# Patient Record
Sex: FEMALE | Race: WHITE | Employment: PART TIME | URBAN - METROPOLITAN AREA
[De-identification: names, ages, dates, MRNs, and addresses within clinical notes are randomized per-mention and may not be internally consistent; named-entity substitution may affect disease eponyms.]

---

## 2019-03-25 ENCOUNTER — OFFICE VISIT (OUTPATIENT)
Dept: HEMATOLOGY/ONCOLOGY | Facility: HOSPITAL | Age: 34
End: 2019-03-25
Attending: INTERNAL MEDICINE
Payer: COMMERCIAL

## 2019-03-25 ENCOUNTER — MED REC SCAN ONLY (OUTPATIENT)
Dept: HEMATOLOGY/ONCOLOGY | Facility: HOSPITAL | Age: 34
End: 2019-03-25

## 2019-03-25 VITALS
WEIGHT: 151 LBS | RESPIRATION RATE: 18 BRPM | HEART RATE: 103 BPM | OXYGEN SATURATION: 100 % | DIASTOLIC BLOOD PRESSURE: 65 MMHG | SYSTOLIC BLOOD PRESSURE: 104 MMHG | TEMPERATURE: 97 F

## 2019-03-25 DIAGNOSIS — O99.019 IRON DEFICIENCY ANEMIA OF PREGNANCY: Primary | ICD-10-CM

## 2019-03-25 DIAGNOSIS — D50.9 IRON DEFICIENCY ANEMIA OF PREGNANCY: Primary | ICD-10-CM

## 2019-03-25 DIAGNOSIS — D64.9 NORMOCYTIC ANEMIA: ICD-10-CM

## 2019-03-25 PROCEDURE — 99245 OFF/OP CONSLTJ NEW/EST HI 55: CPT | Performed by: INTERNAL MEDICINE

## 2019-03-25 RX ORDER — PRENATAL VIT/IRON FUM/FOLIC AC 27 MG-1 MG
1 TABLET ORAL DAILY
COMMUNITY
End: 2019-08-14 | Stop reason: ALTCHOICE

## 2019-03-25 RX ORDER — MELATONIN
325
COMMUNITY
End: 2019-04-02 | Stop reason: ALTCHOICE

## 2019-03-25 NOTE — PROGRESS NOTES
Hematology Initial Consultation Note    Patient Name: Haley Liao  Medical Record Number: [id number]    YOB: 1985   Date of Consultation: 3/25/2019   Other providers:  Dr. Luiz Hamilton    Reason for Consultation:  Haley Liao was se pregnancy 3/25/2019     OB/GYN history:  (currently pregnant). On OCP prior to pregnancy. Regular menses- lasts 5 days, on heaviest day changes tampons 3x per day.      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

## 2019-03-25 NOTE — PROGRESS NOTES
Pt here for consult referred by Dr. Ashley Salvador for anemia. Pt is 32 weeks pregnant. Pt has been feeling fatigued but attributes it to just moving back from [place] 3 weeks ago.

## 2019-03-29 ENCOUNTER — OFFICE VISIT (OUTPATIENT)
Dept: HEMATOLOGY/ONCOLOGY | Facility: HOSPITAL | Age: 34
End: 2019-03-29
Attending: INTERNAL MEDICINE
Payer: COMMERCIAL

## 2019-03-29 VITALS
DIASTOLIC BLOOD PRESSURE: 64 MMHG | SYSTOLIC BLOOD PRESSURE: 105 MMHG | RESPIRATION RATE: 16 BRPM | WEIGHT: 152.38 LBS | OXYGEN SATURATION: 97 % | TEMPERATURE: 98 F | HEART RATE: 104 BPM

## 2019-03-29 DIAGNOSIS — O99.019 IRON DEFICIENCY ANEMIA OF PREGNANCY: Primary | ICD-10-CM

## 2019-03-29 DIAGNOSIS — D64.9 NORMOCYTIC ANEMIA: ICD-10-CM

## 2019-03-29 DIAGNOSIS — D50.9 IRON DEFICIENCY ANEMIA OF PREGNANCY: Primary | ICD-10-CM

## 2019-03-29 LAB
BASOPHILS # BLD AUTO: 0.02 X10(3) UL (ref 0–0.2)
BASOPHILS NFR BLD AUTO: 0.2 %
DEPRECATED RDW RBC AUTO: 46.5 FL (ref 35.1–46.3)
EOSINOPHIL # BLD AUTO: 0.06 X10(3) UL (ref 0–0.7)
EOSINOPHIL NFR BLD AUTO: 0.5 %
ERYTHROCYTE [DISTWIDTH] IN BLOOD BY AUTOMATED COUNT: 14.1 % (ref 11–15)
HCT VFR BLD AUTO: 30.8 % (ref 35–48)
HGB BLD-MCNC: 10.4 G/DL (ref 12–16)
HGB RETIC QN AUTO: 30.7 PG (ref 28.2–36.6)
IMM GRANULOCYTES # BLD AUTO: 0.15 X10(3) UL (ref 0–1)
IMM GRANULOCYTES NFR BLD: 1.1 %
IMM RETICS NFR: 0.15 RATIO (ref 0.1–0.3)
LDH SERPL L TO P-CCNC: 165 U/L (ref 84–246)
LYMPHOCYTES # BLD AUTO: 2.1 X10(3) UL (ref 1–4)
LYMPHOCYTES NFR BLD AUTO: 15.9 %
MCH RBC QN AUTO: 30.7 PG (ref 26–34)
MCHC RBC AUTO-ENTMCNC: 33.8 G/DL (ref 31–37)
MCV RBC AUTO: 90.9 FL (ref 80–100)
MONOCYTES # BLD AUTO: 0.88 X10(3) UL (ref 0.1–1)
MONOCYTES NFR BLD AUTO: 6.7 %
NEUTROPHILS # BLD AUTO: 10 X10 (3) UL (ref 1.5–7.7)
NEUTROPHILS # BLD AUTO: 10 X10(3) UL (ref 1.5–7.7)
NEUTROPHILS NFR BLD AUTO: 75.6 %
PLATELET # BLD AUTO: 288 10(3)UL (ref 150–450)
RBC # BLD AUTO: 3.39 X10(6)UL (ref 3.8–5.3)
RETICS # AUTO: 70.5 X10(3) UL (ref 22.5–147.5)
RETICS/RBC NFR AUTO: 2.1 % (ref 0.5–2.5)
VIT B12 SERPL-MCNC: 336 PG/ML (ref 193–986)
WBC # BLD AUTO: 13.2 X10(3) UL (ref 4–11)

## 2019-03-29 PROCEDURE — 85045 AUTOMATED RETICULOCYTE COUNT: CPT

## 2019-03-29 PROCEDURE — 82607 VITAMIN B-12: CPT

## 2019-03-29 PROCEDURE — 83615 LACTATE (LD) (LDH) ENZYME: CPT

## 2019-03-29 PROCEDURE — 96365 THER/PROPH/DIAG IV INF INIT: CPT

## 2019-03-29 PROCEDURE — 96376 TX/PRO/DX INJ SAME DRUG ADON: CPT

## 2019-03-29 PROCEDURE — 36415 COLL VENOUS BLD VENIPUNCTURE: CPT

## 2019-03-29 PROCEDURE — 83921 ORGANIC ACID SINGLE QUANT: CPT

## 2019-03-29 PROCEDURE — 85025 COMPLETE CBC W/AUTO DIFF WBC: CPT

## 2019-03-29 NOTE — PATIENT INSTRUCTIONS
Iron Dextran injection  Brand Name: Lakesha Cedeño  What is this medicine? IRON DEXTRAN (AHY suzy DEX milton) is an iron complex. Iron is used to make healthy red blood cells, which carry oxygen and nutrients through the body.  This medicine is used to treat people It is important not to miss your dose. Call your doctor or health care professional if you are unable to keep an appointment. Where should I keep my medicine? This drug is given in a hospital or clinic and will not be stored at home.   What should I tell

## 2019-03-29 NOTE — PROGRESS NOTES
Education Record    Learner:  Patient and Spouse    Disease / Charles Rater induced fe def anemia    Barriers / Limitations:  None   Comments:    Method:  Discussion   Comments:    General Topics:  Medication, Precautions, Procedure, Side effects and symp

## 2019-04-02 ENCOUNTER — OFFICE VISIT (OUTPATIENT)
Dept: FAMILY MEDICINE CLINIC | Facility: CLINIC | Age: 34
End: 2019-04-02
Payer: COMMERCIAL

## 2019-04-02 VITALS
SYSTOLIC BLOOD PRESSURE: 102 MMHG | HEART RATE: 96 BPM | WEIGHT: 153.81 LBS | BODY MASS INDEX: 26.92 KG/M2 | RESPIRATION RATE: 16 BRPM | DIASTOLIC BLOOD PRESSURE: 50 MMHG | HEIGHT: 63.5 IN

## 2019-04-02 DIAGNOSIS — F41.9 ANXIETY: ICD-10-CM

## 2019-04-02 DIAGNOSIS — Z00.00 ROUTINE GENERAL MEDICAL EXAMINATION AT A HEALTH CARE FACILITY: Primary | ICD-10-CM

## 2019-04-02 PROCEDURE — 99385 PREV VISIT NEW AGE 18-39: CPT | Performed by: FAMILY MEDICINE

## 2019-04-02 NOTE — PROGRESS NOTES
HPI:   Lino Tan is a 30year old female who presents for a complete physical exam.  Last pap:  2017  Last mammogram:  n/a   Menses:  Currently pregnant  Contraception:  /  Previous colonoscopy:  n/a  Family hx of breast, ovarian, cervical or colon any unusual skin lesions  EYES:no vision problems  LUNGS: denies shortness of breath  CARDIOVASCULAR: denies chest pain  GI: denies abdominal pain,  No constipation or diarrhea  : denies dysuria, vaginal discharge or itching  NEURO: denies headaches  PSY

## 2019-04-05 ENCOUNTER — HOSPITAL ENCOUNTER (INPATIENT)
Facility: HOSPITAL | Age: 34
LOS: 2 days | Discharge: HOME | End: 2019-04-07
Attending: OBSTETRICS & GYNECOLOGY | Admitting: OBSTETRICS & GYNECOLOGY
Payer: COMMERCIAL

## 2019-04-05 PROBLEM — Z34.90 PREGNANCY (HCC): Status: ACTIVE | Noted: 2019-04-05

## 2019-04-05 PROBLEM — Z34.90 PREGNANCY: Status: ACTIVE | Noted: 2019-04-05

## 2019-04-05 RX ORDER — TERBUTALINE SULFATE 1 MG/ML
0.25 INJECTION, SOLUTION SUBCUTANEOUS AS NEEDED
Status: DISCONTINUED | OUTPATIENT
Start: 2019-04-05 | End: 2019-04-05 | Stop reason: HOSPADM

## 2019-04-05 RX ORDER — ONDANSETRON 2 MG/ML
4 INJECTION INTRAMUSCULAR; INTRAVENOUS EVERY 6 HOURS PRN
Status: DISCONTINUED | OUTPATIENT
Start: 2019-04-05 | End: 2019-04-05

## 2019-04-05 RX ORDER — BETAMETHASONE SODIUM PHOSPHATE AND BETAMETHASONE ACETATE 3; 3 MG/ML; MG/ML
INJECTION, SUSPENSION INTRA-ARTICULAR; INTRALESIONAL; INTRAMUSCULAR; SOFT TISSUE
Status: COMPLETED
Start: 2019-04-05 | End: 2019-04-05

## 2019-04-05 RX ORDER — MISOPROSTOL 200 UG/1
1000 TABLET ORAL ONCE
Status: COMPLETED | OUTPATIENT
Start: 2019-04-05 | End: 2019-04-05

## 2019-04-05 RX ORDER — EPHEDRINE SULFATE/0.9% NACL/PF 25 MG/5 ML
5 SYRINGE (ML) INTRAVENOUS AS NEEDED
Status: DISCONTINUED | OUTPATIENT
Start: 2019-04-05 | End: 2019-04-05

## 2019-04-05 RX ORDER — DOCUSATE SODIUM 100 MG/1
100 CAPSULE, LIQUID FILLED ORAL
Status: DISCONTINUED | OUTPATIENT
Start: 2019-04-05 | End: 2019-04-07

## 2019-04-05 RX ORDER — BISACODYL 10 MG
10 SUPPOSITORY, RECTAL RECTAL ONCE AS NEEDED
Status: ACTIVE | OUTPATIENT
Start: 2019-04-05 | End: 2019-04-05

## 2019-04-05 RX ORDER — ACETAMINOPHEN 325 MG/1
650 TABLET ORAL EVERY 6 HOURS PRN
Status: DISCONTINUED | OUTPATIENT
Start: 2019-04-05 | End: 2019-04-07

## 2019-04-05 RX ORDER — SODIUM CHLORIDE, SODIUM LACTATE, POTASSIUM CHLORIDE, CALCIUM CHLORIDE 600; 310; 30; 20 MG/100ML; MG/100ML; MG/100ML; MG/100ML
INJECTION, SOLUTION INTRAVENOUS CONTINUOUS
Status: DISCONTINUED | OUTPATIENT
Start: 2019-04-05 | End: 2019-04-05 | Stop reason: HOSPADM

## 2019-04-05 RX ORDER — ZOLPIDEM TARTRATE 5 MG/1
5 TABLET ORAL NIGHTLY PRN
Status: DISCONTINUED | OUTPATIENT
Start: 2019-04-05 | End: 2019-04-07

## 2019-04-05 RX ORDER — DEXTROSE, SODIUM CHLORIDE, SODIUM LACTATE, POTASSIUM CHLORIDE, AND CALCIUM CHLORIDE 5; .6; .31; .03; .02 G/100ML; G/100ML; G/100ML; G/100ML; G/100ML
INJECTION, SOLUTION INTRAVENOUS AS NEEDED
Status: DISCONTINUED | OUTPATIENT
Start: 2019-04-05 | End: 2019-04-05 | Stop reason: HOSPADM

## 2019-04-05 RX ORDER — METHYLERGONOVINE MALEATE 0.2 MG/ML
0.2 INJECTION INTRAVENOUS ONCE
Status: COMPLETED | OUTPATIENT
Start: 2019-04-05 | End: 2019-04-05

## 2019-04-05 RX ORDER — BETAMETHASONE SODIUM PHOSPHATE AND BETAMETHASONE ACETATE 3; 3 MG/ML; MG/ML
12 INJECTION, SUSPENSION INTRA-ARTICULAR; INTRALESIONAL; INTRAMUSCULAR; SOFT TISSUE EVERY 24 HOURS
Status: COMPLETED | OUTPATIENT
Start: 2019-04-05 | End: 2019-04-05

## 2019-04-05 RX ORDER — MISOPROSTOL 200 UG/1
TABLET ORAL
Status: COMPLETED
Start: 2019-04-05 | End: 2019-04-05

## 2019-04-05 RX ORDER — ONDANSETRON 2 MG/ML
INJECTION INTRAMUSCULAR; INTRAVENOUS
Status: DISPENSED
Start: 2019-04-05 | End: 2019-04-05

## 2019-04-05 RX ORDER — IBUPROFEN 600 MG/1
600 TABLET ORAL ONCE AS NEEDED
Status: DISCONTINUED | OUTPATIENT
Start: 2019-04-05 | End: 2019-04-05 | Stop reason: HOSPADM

## 2019-04-05 RX ORDER — SIMETHICONE 80 MG
80 TABLET,CHEWABLE ORAL 3 TIMES DAILY PRN
Status: DISCONTINUED | OUTPATIENT
Start: 2019-04-05 | End: 2019-04-07

## 2019-04-05 RX ORDER — NALBUPHINE HCL 10 MG/ML
2.5 AMPUL (ML) INJECTION
Status: DISCONTINUED | OUTPATIENT
Start: 2019-04-05 | End: 2019-04-05

## 2019-04-05 RX ORDER — AMMONIA INHALANTS 0.04 G/.3ML
0.3 INHALANT RESPIRATORY (INHALATION) AS NEEDED
Status: DISCONTINUED | OUTPATIENT
Start: 2019-04-05 | End: 2019-04-05 | Stop reason: HOSPADM

## 2019-04-05 RX ORDER — METHYLERGONOVINE MALEATE 0.2 MG/ML
INJECTION INTRAVENOUS
Status: DISPENSED
Start: 2019-04-05 | End: 2019-04-06

## 2019-04-05 RX ORDER — TRISODIUM CITRATE DIHYDRATE AND CITRIC ACID MONOHYDRATE 500; 334 MG/5ML; MG/5ML
30 SOLUTION ORAL AS NEEDED
Status: DISCONTINUED | OUTPATIENT
Start: 2019-04-05 | End: 2019-04-05 | Stop reason: HOSPADM

## 2019-04-05 RX ORDER — IBUPROFEN 600 MG/1
600 TABLET ORAL EVERY 6 HOURS
Status: DISCONTINUED | OUTPATIENT
Start: 2019-04-05 | End: 2019-04-07

## 2019-04-05 NOTE — L&D DELIVERY NOTE
Amrit Matthew [[id number]]    Labor Events     labor?:  Yes   steroids?:  Partial Course  Antibiotics received during labor?:  Yes  Antibiotics (enter # doses in comment):  ampicillin  Rupture date/time:  2019 0100     Rupture typ Blue or pale Acrocyanotic Completely pink    Heart rate Absent <100 bpm >100 bpm    Reflex irritability No response Grimace Cry or active withdrawal    Muscle tone Limp Some flexion Active motion    Respiratory effort Absent Weak cry; hypoventilation Good,

## 2019-04-05 NOTE — H&P
[facility]    PATIENT'S NAME: Gordon Marshallris   ATTENDING PHYSICIAN: Emilee Rg M.D.    PATIENT ACCOUNT#:   [de-identified]    LOCATION:  [address] [place] [address]  MEDICAL RECORD #:   [id number]       YOB: 1985  ADMISSION DATE: weighs approximately 151 pounds. Vital signs stable. Afebrile, blood pressure 87/53, pulse 116, temperature 98.1. ASSESSMENT:    1.    Intrauterine pregnancy at 34-1/2 weeks with  spontaneous rupture of membranes, in labor, status post 1 dose

## 2019-04-05 NOTE — PROGRESS NOTES
Here to drop off belongings w/ Valentine Jimenez RN L&D, then over to NICU to see baby with Josh Paige and , will do assessment upon return.

## 2019-04-05 NOTE — PROGRESS NOTES
Report was called to [facility]. Pt was brought to NICU in wheelchair-stable condition. RN present with patient.

## 2019-04-06 PROCEDURE — 99254 IP/OBS CNSLTJ NEW/EST MOD 60: CPT | Performed by: SPECIALIST

## 2019-04-06 RX ORDER — MELATONIN
325
Status: DISCONTINUED | OUTPATIENT
Start: 2019-04-06 | End: 2019-04-07

## 2019-04-06 NOTE — CONSULTS
[facility] Report of Consultation      Patient Name: Salvador Huntley   YOB: 1985  Medical Record Number: [id number]  Consulting Physician: Corby Pino. Arlin Ellis M.D.    Referring Physician: Jigna Quispe    Date o betamethasone sod phos & acet (CELESTONE) 6 (3-3) MG/ML injection 12 mg 12 mg Intramuscular Q24H   [COMPLETED] lactated ringers IV bolus 1,000 mL 1,000 mL Intravenous Once   [COMPLETED] fentaNYL citrate (SUBLIMAZE) 0.05 MG/ML injection 100 mcg 100 mcg Epid Pulse 80   Temp 98.6 °F (37 °C) (Oral)   Resp 16   Ht 1.6 m (5' 3\")   Wt 69.4 kg (153 lb)   SpO2 99%   Breastfeeding? Yes   BMI 27.10 kg/m²     Physical Examination   Constitutional NAD. Head Normocephalic and atraumatic.   Eyes Conjunctiva clear; sclera Value Ref Range    Ferritin 527.4 (H) 12.0 - 160.0 ng/mL     [facility] Impression and Plan   Iron deficiency anemia complicating pregnancy: Patient received 1000 mg of IV iron 1 week ago.  Hb decreased after delivery but only 1 g

## 2019-04-06 NOTE — PLAN OF CARE
ANXIETY    • Will report anxiety at manageable levels Progressing        PAIN - ADULT    • Verbalizes/displays adequate comfort level or patient's stated pain goal Progressing        Patient/Family Goals    • Patient/Family Long Term Goal Progressing

## 2019-04-06 NOTE — L&D DELIVERY NOTE
[facility]    PATIENT'S NAME: Ana [place]   ATTENDING PHYSICIAN: Vitor Salgado M.D.    PATIENT ACCOUNT #: [de-identified] LOCATION:  [address] [place] [address]   MEDICAL RECORD #: [id number] YOB: 1985   ADMISSION DATE: 04/05/2019 MEGAN Kimmie Marie was brought to the NICU secondary to prematurity, but he was doing well.     Dictated By Kim Poon M.D.  d: 04/05/2019 14:48:54  t: 04/05/2019 19:11:55  [facility] [id number]  [facility]/

## 2019-04-06 NOTE — PROGRESS NOTES
[facility]  Post-Partum Vaginal Delivery Progress Note    Mary Venus Patient Status:  Inpatient    1985 MRN [id number]   [place] 2SW-J Attending [address] [place] [address], Yenny Davila 647 Day # 1 PCP Charlie Young MD     [address]

## 2019-04-07 VITALS
RESPIRATION RATE: 18 BRPM | HEART RATE: 80 BPM | BODY MASS INDEX: 27.11 KG/M2 | OXYGEN SATURATION: 99 % | HEIGHT: 63 IN | TEMPERATURE: 98 F | WEIGHT: 153 LBS | DIASTOLIC BLOOD PRESSURE: 75 MMHG | SYSTOLIC BLOOD PRESSURE: 100 MMHG

## 2019-04-07 PROBLEM — Z34.90 PREGNANCY (HCC): Status: RESOLVED | Noted: 2019-04-05 | Resolved: 2019-04-07

## 2019-04-07 PROBLEM — Z34.90 PREGNANCY: Status: RESOLVED | Noted: 2019-04-05 | Resolved: 2019-04-07

## 2019-04-07 NOTE — PLAN OF CARE
Problem: Patient/Family Goals  Goal: Patient/Family Long Term Goal  Description  Patient's Long Term Goal: patient will have uncomplicated delivery of , following steroid protocol.     Interventions:  - See additional Care Plan goals for specific i Term Goal:Experiences normal postpartum course  Description  INTERVENTIONS:  - Assess and monitor vital signs and lab values. - Assess fundus and lochia. - Provide ice/sitz baths for perineum discomfort.   - Monitor healing of incision/episiotomy/lacerati assistance with proper latch. - Review techniques for milk expression (breast pumping) and storage of breast milk. Provide pumping equipment/supplies, instructions and assistance, as needed.   - Encourage rooming-in and breast feeding on demand.  Dominique Holcomb

## 2019-04-07 NOTE — PROGRESS NOTES
Discharge patient home as order. Teaching complete, patient feel comfortable to take care herself, and baby is stay in NICU.

## 2019-04-07 NOTE — PROGRESS NOTES
[facility]  Post-Partum Vaginal Delivery Progress Note    Sera Cruz Patient Status:  Inpatient    1985 MRN [id number]   [facility] [place] 2SW-J Attending [address] [place] [address], [facility] Jah Davila 647 Day # 2 PCP Chelsea Brown MD     [address]

## 2019-04-10 ENCOUNTER — TELEPHONE (OUTPATIENT)
Dept: OBGYN UNIT | Facility: HOSPITAL | Age: 34
End: 2019-04-10

## 2019-04-10 NOTE — PROGRESS NOTES
Reviewed self care w / mom, she verbalizes understanding of instructions reviewed. Encourage to follow up w/ MDs as directed and w/ questions/concerns. Infant remains in nicu and doing well, mom here much of the time.

## 2019-04-22 ENCOUNTER — OFFICE VISIT (OUTPATIENT)
Dept: HEMATOLOGY/ONCOLOGY | Facility: HOSPITAL | Age: 34
End: 2019-04-22
Attending: INTERNAL MEDICINE
Payer: COMMERCIAL

## 2019-04-22 VITALS
WEIGHT: 137.5 LBS | HEART RATE: 83 BPM | SYSTOLIC BLOOD PRESSURE: 109 MMHG | RESPIRATION RATE: 18 BRPM | TEMPERATURE: 97 F | BODY MASS INDEX: 24.36 KG/M2 | OXYGEN SATURATION: 97 % | DIASTOLIC BLOOD PRESSURE: 59 MMHG | HEIGHT: 62.99 IN

## 2019-04-22 DIAGNOSIS — D50.9 IRON DEFICIENCY ANEMIA OF PREGNANCY: ICD-10-CM

## 2019-04-22 DIAGNOSIS — O99.019 IRON DEFICIENCY ANEMIA OF PREGNANCY: ICD-10-CM

## 2019-04-22 PROCEDURE — 99214 OFFICE O/P EST MOD 30 MIN: CPT | Performed by: INTERNAL MEDICINE

## 2019-04-22 NOTE — PROGRESS NOTES
Education Record    Learner:  Patient    Disease / Raynald Scrivener    Barriers / Limitations:  None   Comments:    Method:  Brief focused   Comments:    General Topics:  Plan of care reviewed   Comments:    Outcome:  Shows understanding   Comments:pt here

## 2019-04-22 NOTE — PROGRESS NOTES
Hematology Clinic Follow Up Visit    Patient Name: Sera Cruz  Medical Record Number: [id number]    YOB: 1985    Other providers:  Dr. Luiz Hamilton    Reason for Consultation:  Sera Cruz was seen today for the diagnosis of an (PRENATAL/FOLIC ACID) Oral Tab Take 1 tablet by mouth daily. Disp:  Rfl:      Allergies:   No Known Allergies    Psychosocial History:  Social History    Social History Narrative      . 1 son born 4/2019.   Not currently working since moved from [employer] palpable lymphadenopathy  Skin: no rashes or petechiae    Laboratory:  Lab Results   Component Value Date    WBC 12.3 (H) 04/07/2019    WBC 23.5 (H) 04/06/2019    WBC 18.8 (H) 04/05/2019    HGB 10.5 (L) 04/07/2019    HGB 9.8 (L) 04/06/2019    HGB 10.9 (L)

## 2019-05-20 ENCOUNTER — APPOINTMENT (OUTPATIENT)
Dept: HEMATOLOGY/ONCOLOGY | Facility: HOSPITAL | Age: 34
End: 2019-05-20
Attending: INTERNAL MEDICINE
Payer: COMMERCIAL

## 2019-06-17 NOTE — TELEPHONE ENCOUNTER
Pt states that per Dr Mami Barragan she was to get refill for Sertraline but never did? Can we call in?   Lucila

## 2019-06-17 NOTE — TELEPHONE ENCOUNTER
Patient called back and schedule her medication follow up 10/1/19 with Dr. Scott Headings please refill her medication.

## 2019-06-17 NOTE — TELEPHONE ENCOUNTER
LOV 4/2/19 and at that time, pt was advised to follow up in 6 months. No future appointments. Please call pt and assist her in scheduling appt for October. Once appt scheduled, please send task back to triage so that we can refill Zoloft until the appt.

## 2019-06-17 NOTE — TELEPHONE ENCOUNTER
LM for patient to schedule her medication follow up with  in October and send back to triage for refill for Zoloft.

## 2019-07-17 ENCOUNTER — TELEPHONE (OUTPATIENT)
Dept: FAMILY MEDICINE CLINIC | Facility: CLINIC | Age: 34
End: 2019-07-17

## 2019-07-17 NOTE — TELEPHONE ENCOUNTER
Patient would like to switch her Zoloft back down to 25 mg, was placed on 50 mg while pregnant. Patient is now 3 months post partum.  Please advise

## 2019-07-25 LAB
AMB EXT CHOLESTEROL, TOTAL: 220 MG/DL
AMB EXT GLUCOSE: 93 MG/DL
AMB EXT HDL CHOLESTEROL: 59 MG/DL
AMB EXT LDL CHOLESTEROL, DIRECT: 134 MG/DL
AMB EXT TRIGLYCERIDES: 134 MG/DL
AMB EXT TSH: 1.61 MIU/ML
HCT: 40.1 % (ref 35–48)
HGB: 12.6 G/DL (ref 12–16)
MEAN CELL VOLUME: 93 FL (ref 80–100)
PLATELETS: 251 10(3)UL
WBC: 7 X10(3) UL (ref 4–11)

## 2019-07-30 ENCOUNTER — TELEPHONE (OUTPATIENT)
Dept: FAMILY MEDICINE CLINIC | Facility: CLINIC | Age: 34
End: 2019-07-30

## 2019-07-30 NOTE — TELEPHONE ENCOUNTER
Labs received from outside source. Overall look good except mildly elevated LDL cholesterol. Cont healthy diet and exercise. We will recheck next year. Please let me know if you have any questions.   [address], DO 7/30/2019 7:56 AM

## 2019-08-12 ENCOUNTER — TELEPHONE (OUTPATIENT)
Dept: FAMILY MEDICINE CLINIC | Facility: CLINIC | Age: 34
End: 2019-08-12

## 2019-08-12 NOTE — TELEPHONE ENCOUNTER
Noticed blood in toilet some discomfort when going to bathroom no clots no pain made appointment for Wednesday at 9:30 cautioned patient id passing clots or having pain must go to the ED to be seen

## 2019-08-14 ENCOUNTER — OFFICE VISIT (OUTPATIENT)
Dept: FAMILY MEDICINE CLINIC | Facility: CLINIC | Age: 34
End: 2019-08-14
Payer: COMMERCIAL

## 2019-08-14 VITALS
WEIGHT: 133 LBS | HEART RATE: 84 BPM | HEIGHT: 63.5 IN | RESPIRATION RATE: 16 BRPM | BODY MASS INDEX: 23.27 KG/M2 | DIASTOLIC BLOOD PRESSURE: 62 MMHG | SYSTOLIC BLOOD PRESSURE: 104 MMHG | TEMPERATURE: 98 F

## 2019-08-14 DIAGNOSIS — K64.8 INTERNAL HEMORRHOID: Primary | ICD-10-CM

## 2019-08-14 PROCEDURE — 99213 OFFICE O/P EST LOW 20 MIN: CPT | Performed by: PHYSICIAN ASSISTANT

## 2019-08-14 RX ORDER — ETONOGESTREL/ETHINYL ESTRADIOL .12-.015MG
RING, VAGINAL VAGINAL
Refills: 3 | COMMUNITY
Start: 2019-07-11 | End: 2020-04-17

## 2019-08-14 RX ORDER — MULTIVITAMIN
1 TABLET ORAL DAILY
COMMUNITY

## 2019-08-14 NOTE — PROGRESS NOTES
Patient presents with:  Stool: Pt noticed blood in stool for 6 days       HISTORY OF PRESENT ILLNESS  Ayaka Perez is a 30year old female who presents for evaluation of bloody stools.  Started to notice about 6 days ago that the toilet water was full hemorrhoids. ASSESSMENT/ PLAN  1. Internal hemorrhoids  Recommend Miralax, increased fiber and water intake. Also start anusol twice a day for 5 days. Follow up for any persistent or worsening symptoms, fevers, chills.     Patient expresses understanding

## 2019-08-20 NOTE — TELEPHONE ENCOUNTER
LOV 8/14/2019 was acute. Last Px was 4/2/19     Patient was asked to follow-up in: 6 months    Appointment scheduled: 10/1/2019 Gene Carson MD     Refill request for:    Requested Prescriptions     Pending Prescriptions Disp Refills   • SERTRALINE HCL

## 2019-09-13 LAB
ALT: 15
AST: 13
VITAMIN D, 25-HYDROXY: 44.1 NG/ML

## 2019-10-01 PROBLEM — E55.9 VITAMIN D DEFICIENCY: Status: ACTIVE | Noted: 2019-10-01

## 2019-10-01 PROBLEM — F41.9 ANXIETY: Status: ACTIVE | Noted: 2019-10-01

## 2019-10-01 NOTE — PROGRESS NOTES
Param Simpson is a 30year old female. HPI:   Patient presents for medication check. History of anxiety. Was on 25 mg of Zoloft prior to her pregnancy. She increased to 50 mg during her pregnancy. She is now 6 months postpartum.   She recently d [place].       Orders Placed This Encounter      Flulaval 6 months and older 0.5 ml Quad PF [[id number]]    Meds & Refills for this Visit:  Requested Prescriptions      No prescriptions requested or ordered in this encounter     Imaging & Consults:  FLULAVAL INFL

## 2020-03-11 ENCOUNTER — TELEPHONE (OUTPATIENT)
Dept: FAMILY MEDICINE CLINIC | Facility: CLINIC | Age: 35
End: 2020-03-11

## 2020-03-11 RX ORDER — SERTRALINE HYDROCHLORIDE 25 MG/1
25 TABLET, FILM COATED ORAL
Qty: 90 TABLET | Refills: 3 | OUTPATIENT
Start: 2020-03-11

## 2020-03-11 NOTE — TELEPHONE ENCOUNTER
Refill request for:    Requested Prescriptions     Pending Prescriptions Disp Refills   • Sertraline HCl 25 MG Oral Tab 90 tablet 3     Sig: Take 1 tablet (25 mg total) by mouth once daily.         Last Prescribed Quantity Refills   10/1/2019 90 3     LOV 1

## 2020-03-11 NOTE — TELEPHONE ENCOUNTER
Notified pt that she has refills remaining. She will call pharmacy.  Denied refill request.    Future Appointments   Date Time Provider Sammy Fay   4/17/2020 12:30 PM Carmela Vargas MD [facility]

## 2020-03-11 NOTE — TELEPHONE ENCOUNTER
Patient requesting refill on Sertraline 25 MG.  Patient was taking 50 MG but is wanting to go down to 25 MG needs new prescription sent to [facility]

## 2020-03-17 ENCOUNTER — TELEPHONE (OUTPATIENT)
Dept: FAMILY MEDICINE CLINIC | Facility: CLINIC | Age: 35
End: 2020-03-17

## 2020-03-17 RX ORDER — SERTRALINE HYDROCHLORIDE 25 MG/1
25 TABLET, FILM COATED ORAL
Qty: 90 TABLET | Refills: 0 | Status: SHIPPED | OUTPATIENT
Start: 2020-03-17 | End: 2020-12-04

## 2020-03-17 NOTE — TELEPHONE ENCOUNTER
Refill for 25 mg Sertraline was never received to [facility] [address], she would like it sent to [facility] [employer]. There is no receipt received from [facility] [address]. Patient only has two days left.

## 2020-03-27 ENCOUNTER — TELEPHONE (OUTPATIENT)
Dept: FAMILY MEDICINE CLINIC | Facility: CLINIC | Age: 35
End: 2020-03-27

## 2020-03-27 NOTE — TELEPHONE ENCOUNTER
Received medical records request from [facility] requesting patient's medical records from the past 5 years. All records located in [place] in which request was sent to [employer].  Contact [facility] [phone]  Case # [id number]

## 2020-04-02 ENCOUNTER — TELEPHONE (OUTPATIENT)
Dept: FAMILY MEDICINE CLINIC | Facility: CLINIC | Age: 35
End: 2020-04-02

## 2020-04-02 NOTE — TELEPHONE ENCOUNTER
Virtual/Telephone Check-In    Alpesh Rodrigez verbally consents to a Virtual/Telephone Check-In service on 4/2/2020. Patient understands and accepts financial responsibility for any deductible, co-insurance and/or co-pays associated with this service.

## 2020-04-17 NOTE — PROGRESS NOTES
Virtual Check-In    Janva Simpson verbally consents to a [employer] on 04/17/20. Patient understands and accepts financial responsibility for any deductible, co-insurance and/or co-pays associated with this service.     Duration of the se

## 2020-06-12 ENCOUNTER — TELEPHONE (OUTPATIENT)
Dept: FAMILY MEDICINE CLINIC | Facility: CLINIC | Age: 35
End: 2020-06-12

## 2020-11-25 ENCOUNTER — APPOINTMENT (OUTPATIENT)
Dept: LAB | Age: 35
End: 2020-11-25
Attending: PHYSICIAN ASSISTANT
Payer: COMMERCIAL

## 2020-11-25 ENCOUNTER — VIRTUAL PHONE E/M (OUTPATIENT)
Dept: FAMILY MEDICINE CLINIC | Facility: CLINIC | Age: 35
End: 2020-11-25
Payer: COMMERCIAL

## 2020-11-25 ENCOUNTER — TELEPHONE (OUTPATIENT)
Dept: FAMILY MEDICINE CLINIC | Facility: CLINIC | Age: 35
End: 2020-11-25

## 2020-11-25 DIAGNOSIS — Z20.822 ENCOUNTER FOR SCREENING LABORATORY TESTING FOR COVID-19 VIRUS: Primary | ICD-10-CM

## 2020-11-25 DIAGNOSIS — Z20.822 EXPOSURE TO COVID-19 VIRUS: ICD-10-CM

## 2020-11-25 DIAGNOSIS — R50.9 FEVER, UNSPECIFIED: ICD-10-CM

## 2020-11-25 DIAGNOSIS — Z20.822 EXPOSURE TO COVID-19 VIRUS: Primary | ICD-10-CM

## 2020-11-25 PROCEDURE — 99213 OFFICE O/P EST LOW 20 MIN: CPT | Performed by: PHYSICIAN ASSISTANT

## 2020-11-25 NOTE — TELEPHONE ENCOUNTER
Patient is awaiting her son's covid results and now herself has a low grade fever, almost 8 weeks pregnant, not sure if she should be tested, looking for a recommendation

## 2020-11-25 NOTE — PROGRESS NOTES
Virtual Telephone Check-In    Marylou Haro verbally consents to a Virtual/Telephone Check-In visit on 11/25/20. Patient has been referred to the [facility] website at [email or web address] to review the yearly Consent to Treat document.     Patient under

## 2020-11-25 NOTE — TELEPHONE ENCOUNTER
C/o temp of 99.5 has body aches son was in urgent care for difficulty breathing still waiting on results she wants to know if she should be tested.

## 2020-11-30 ENCOUNTER — TELEPHONE (OUTPATIENT)
Dept: FAMILY MEDICINE CLINIC | Facility: CLINIC | Age: 35
End: 2020-11-30

## 2020-11-30 ENCOUNTER — VIRTUAL PHONE E/M (OUTPATIENT)
Dept: FAMILY MEDICINE CLINIC | Facility: CLINIC | Age: 35
End: 2020-11-30
Payer: COMMERCIAL

## 2020-11-30 DIAGNOSIS — U07.1 COVID-19 VIRUS INFECTION: Primary | ICD-10-CM

## 2020-11-30 DIAGNOSIS — Z3A.08 8 WEEKS GESTATION OF PREGNANCY: ICD-10-CM

## 2020-11-30 PROCEDURE — 99213 OFFICE O/P EST LOW 20 MIN: CPT | Performed by: PHYSICIAN ASSISTANT

## 2020-11-30 NOTE — PROGRESS NOTES
Virtual Check-In    Alexus Razo verbally consents to a [employer] on 11/30/20. Patient understands and accepts financial responsibility for any deductible, co-insurance and/or co-pays associated with this service.     Duration of the se

## 2020-12-04 ENCOUNTER — TELEPHONE (OUTPATIENT)
Dept: FAMILY MEDICINE CLINIC | Facility: CLINIC | Age: 35
End: 2020-12-04

## 2020-12-04 NOTE — TELEPHONE ENCOUNTER
Called and takked to patient and informed her of dosage change and refill from Dr Mary Feliciano she will follow up in 3 months

## 2020-12-04 NOTE — TELEPHONE ENCOUNTER
Patient called requesting a refill on the Sertraline, also if Dr Juan Castle would approve changing it from 25mg to 50mg?

## 2021-01-15 NOTE — PROGRESS NOTES
Outpatient Maternal-Fetal Medicine Consultation    Dear Dr. Aguilar Exchange,    Thank you for requesting ultrasound evaluation and maternal fetal medicine consultation on your patient Emmanuel Denny.   As you are aware she is a 31year old female with a Singlet daily., Disp: , Rfl:   •  Cholecalciferol 5000 units Oral Tab, Take 1 tablet by mouth daily. Pt stating taking 2000 IU , Disp: , Rfl:   •  Levonorgestrel-Ethinyl Estrad (LEVORA 0.15/30, 28,) 0.15-30 MG-MCG Oral Tab, Take 1 tablet by mouth daily. , Disp: 3 P morbidity and mortality. Approximately 20 percent of  deliveries are iatrogenic and are performed for maternal or fetal indications, such as intrauterine growth restriction, preeclampsia, placenta previa, or nonreassuring fetal testing.  Of the [facility] Cocaine is the most common illicit substance associated with  labor in the [place], and has been detected in approximately 60 percent of women in  labor who have positive toxicology tests.  Alcohol and toluene are additional substances a including over 1700 women with a marvin gestation and past history of sPTB, hydroxyprogesterone caproate injections (Katie) did not reduce PTB <35 weeks compared with placebo (11.0 versus 11.5 percent).  After review of these findings, a [address] and [place] coronaviruses) in pregnancy. Among severely ill women, the rate of  delivery and  delivery is increased. This may be due to a number of factors including iatrogenic delivery to facilitate ventilation of the mother.   In light of these early pregnant may require increased doses of SSRI to achieve euthymia.  Women treated for major depression and who are euthymic prior to pregnancy are more likely to experience a relapse when medication is discontinued as compared to pregnant women who continue satisfaction.     IMPRESSION:  · IUP at 16w0d  · Normal routine fetal ultrasound  · History of a LEEP with a subsequent 34-week delivery  · H/o PPROM/ PTD - normal cervical length by transabdominal ultrasound  · Posterior low-lying placenta  · Anxiety contr

## 2021-01-22 ENCOUNTER — OFFICE VISIT (OUTPATIENT)
Dept: PERINATAL CARE | Facility: HOSPITAL | Age: 36
End: 2021-01-22
Attending: OBSTETRICS & GYNECOLOGY
Payer: COMMERCIAL

## 2021-01-22 VITALS
SYSTOLIC BLOOD PRESSURE: 111 MMHG | BODY MASS INDEX: 21.85 KG/M2 | WEIGHT: 128 LBS | HEART RATE: 102 BPM | DIASTOLIC BLOOD PRESSURE: 69 MMHG | HEIGHT: 64 IN

## 2021-01-22 DIAGNOSIS — O35.9XX0 SUSPECTED FETAL ABNORMALITY AFFECTING MANAGEMENT OF MOTHER, SINGLE OR UNSPECIFIED FETUS: ICD-10-CM

## 2021-01-22 DIAGNOSIS — O09.892 HISTORY OF PRETERM DELIVERY, CURRENTLY PREGNANT IN SECOND TRIMESTER: ICD-10-CM

## 2021-01-22 DIAGNOSIS — U07.1 COVID-19: ICD-10-CM

## 2021-01-22 DIAGNOSIS — F41.9 ANXIETY: ICD-10-CM

## 2021-01-22 DIAGNOSIS — U07.1 COVID-19: Primary | ICD-10-CM

## 2021-01-22 PROCEDURE — 76805 OB US >/= 14 WKS SNGL FETUS: CPT | Performed by: OBSTETRICS & GYNECOLOGY

## 2021-01-22 PROCEDURE — 99205 OFFICE O/P NEW HI 60 MIN: CPT | Performed by: OBSTETRICS & GYNECOLOGY

## 2021-02-16 NOTE — PROGRESS NOTES
Landon Garcia     Dear Dr. Mendel Ricketts,     Thank you for requesting ultrasound evaluation and maternal fetal medicine consultation on your patient Silas Dancer.   As you are aware she is a 31year old female  with a hours later she delivered her son Margy Escobedo. She had moved across country 4 weeks prior to his delivery. She had also been weaning on her sertraline dose and feels that stress may have played a part in her  delivery.   Recurrence risk reviewed.   See [facility] perform  delivery, these data support allowing a trial of labor in pregnancies in which the placenta is more than 10 mm from the internal os. Postpartum hemorrhage has been reported to be increased when the placenta is less than 20mm to the os.

## 2021-02-23 ENCOUNTER — OFFICE VISIT (OUTPATIENT)
Dept: PERINATAL CARE | Facility: HOSPITAL | Age: 36
End: 2021-02-23
Attending: OBSTETRICS & GYNECOLOGY
Payer: COMMERCIAL

## 2021-02-23 VITALS
HEIGHT: 64 IN | DIASTOLIC BLOOD PRESSURE: 69 MMHG | BODY MASS INDEX: 22.71 KG/M2 | HEART RATE: 77 BPM | WEIGHT: 133 LBS | SYSTOLIC BLOOD PRESSURE: 105 MMHG

## 2021-02-23 DIAGNOSIS — U07.1 COVID-19: ICD-10-CM

## 2021-02-23 DIAGNOSIS — Z82.79 FAMILY HISTORY OF BIRTH DEFECT: ICD-10-CM

## 2021-02-23 DIAGNOSIS — O09.892 HISTORY OF PRETERM DELIVERY, CURRENTLY PREGNANT IN SECOND TRIMESTER: Primary | ICD-10-CM

## 2021-02-23 DIAGNOSIS — O09.892 HISTORY OF PRETERM DELIVERY, CURRENTLY PREGNANT IN SECOND TRIMESTER: ICD-10-CM

## 2021-02-23 PROCEDURE — 76811 OB US DETAILED SNGL FETUS: CPT | Performed by: OBSTETRICS & GYNECOLOGY

## 2021-02-23 PROCEDURE — 76817 TRANSVAGINAL US OBSTETRIC: CPT | Performed by: OBSTETRICS & GYNECOLOGY

## 2021-02-23 PROCEDURE — 76817 TRANSVAGINAL US OBSTETRIC: CPT

## 2021-02-23 PROCEDURE — 99215 OFFICE O/P EST HI 40 MIN: CPT | Performed by: OBSTETRICS & GYNECOLOGY

## 2021-02-23 NOTE — PROGRESS NOTES
Pt here for Level 2 ultrasound  States +FM  No complaints  States at [facility] office scan at 18 weeks has Previa states No bleeding or spotting

## 2021-06-03 ENCOUNTER — HOSPITAL ENCOUNTER (OUTPATIENT)
Facility: HOSPITAL | Age: 36
Setting detail: OBSERVATION
Discharge: HOME | End: 2021-06-03
Attending: OBSTETRICS & GYNECOLOGY | Admitting: OBSTETRICS & GYNECOLOGY
Payer: COMMERCIAL

## 2021-06-03 VITALS
BODY MASS INDEX: 26.46 KG/M2 | DIASTOLIC BLOOD PRESSURE: 69 MMHG | HEIGHT: 64.02 IN | HEART RATE: 93 BPM | WEIGHT: 155 LBS | TEMPERATURE: 98 F | RESPIRATION RATE: 18 BRPM | SYSTOLIC BLOOD PRESSURE: 111 MMHG

## 2021-06-03 PROBLEM — Z34.90 PREGNANCY: Status: ACTIVE | Noted: 2021-06-03

## 2021-06-03 PROBLEM — Z34.90 PREGNANCY (HCC): Status: ACTIVE | Noted: 2021-06-03

## 2021-06-03 PROCEDURE — 99213 OFFICE O/P EST LOW 20 MIN: CPT

## 2021-06-03 PROCEDURE — 96360 HYDRATION IV INFUSION INIT: CPT

## 2021-06-03 PROCEDURE — 96372 THER/PROPH/DIAG INJ SC/IM: CPT

## 2021-06-03 PROCEDURE — 85025 COMPLETE CBC W/AUTO DIFF WBC: CPT | Performed by: OBSTETRICS & GYNECOLOGY

## 2021-06-03 PROCEDURE — 96361 HYDRATE IV INFUSION ADD-ON: CPT

## 2021-06-03 PROCEDURE — 59025 FETAL NON-STRESS TEST: CPT

## 2021-06-03 PROCEDURE — 36415 COLL VENOUS BLD VENIPUNCTURE: CPT

## 2021-06-03 RX ORDER — URSODIOL 300 MG/1
300 CAPSULE ORAL 2 TIMES DAILY
Status: ON HOLD | COMMUNITY
End: 2021-06-03

## 2021-06-03 RX ORDER — SODIUM CHLORIDE, SODIUM LACTATE, POTASSIUM CHLORIDE, CALCIUM CHLORIDE 600; 310; 30; 20 MG/100ML; MG/100ML; MG/100ML; MG/100ML
INJECTION, SOLUTION INTRAVENOUS CONTINUOUS
Status: DISCONTINUED | OUTPATIENT
Start: 2021-06-03 | End: 2021-06-03

## 2021-06-03 RX ORDER — BETAMETHASONE SODIUM PHOSPHATE AND BETAMETHASONE ACETATE 3; 3 MG/ML; MG/ML
12 INJECTION, SUSPENSION INTRA-ARTICULAR; INTRALESIONAL; INTRAMUSCULAR; SOFT TISSUE EVERY 24 HOURS
Status: DISCONTINUED | OUTPATIENT
Start: 2021-06-03 | End: 2021-06-03

## 2021-06-03 NOTE — PROGRESS NOTES
Pt is a 31year old female admitted to [facility]-A. Patient presents with:  R/o  Labor     Pt is  34w6d intra-uterine pregnancy. History obtained, consents signed. Oriented to room, staff, and plan of care.

## 2021-06-03 NOTE — NST
Nonstress Test   Patient: Emanuel Whyte    Gestation: 34w6d    NST:       Variability: Moderate           Accelerations: Yes           Decelerations: None            Baseline: 135 BPM           Uterine Irritability: Yes           Contractions: Urbano Purvis

## 2021-06-03 NOTE — H&P
[facility]    PATIENT'S NAME: Carmel Denise   ATTENDING PHYSICIAN: Kamilla Leavitt M.D.    PATIENT ACCOUNT#:   [de-identified]    LOCATION:  [facility]  MEDICAL RECORD #:   [id number]       YOB: 1985  ADMISSION DATE:       06/0 negative. Previous hemoglobin 10.9. Patient is O positive. Hepatitis B surface antigen negative. Rubella immune. RPR nonreactive.     MEDICATIONS:  Prenatal vitamins, ursodiol 300 mg 1 tab p.o. b.i.d., Zoloft 50 mg 1 tab p.o. daily, vitamin D3 at 2000

## 2021-06-04 ENCOUNTER — HOSPITAL ENCOUNTER (OUTPATIENT)
Facility: HOSPITAL | Age: 36
Discharge: HOME | End: 2021-06-04
Attending: STUDENT IN AN ORGANIZED HEALTH CARE EDUCATION/TRAINING PROGRAM | Admitting: STUDENT IN AN ORGANIZED HEALTH CARE EDUCATION/TRAINING PROGRAM
Payer: COMMERCIAL

## 2021-06-04 PROCEDURE — 96372 THER/PROPH/DIAG INJ SC/IM: CPT

## 2021-06-04 RX ORDER — BETAMETHASONE SODIUM PHOSPHATE AND BETAMETHASONE ACETATE 3; 3 MG/ML; MG/ML
12 INJECTION, SUSPENSION INTRA-ARTICULAR; INTRALESIONAL; INTRAMUSCULAR; SOFT TISSUE ONCE
Status: COMPLETED | OUTPATIENT
Start: 2021-06-04 | End: 2021-06-04

## 2021-06-04 RX ORDER — BETAMETHASONE SODIUM PHOSPHATE AND BETAMETHASONE ACETATE 3; 3 MG/ML; MG/ML
INJECTION, SUSPENSION INTRA-ARTICULAR; INTRALESIONAL; INTRAMUSCULAR; SOFT TISSUE
Status: COMPLETED
Start: 2021-06-04 | End: 2021-06-04

## 2021-06-04 NOTE — PROGRESS NOTES
Pt is a 31year old female admitted to TR5/TRG5-A. Patient presents with:  Betamethasone Injection     Pt is  35w0d intra-uterine pregnancy. History obtained, consents signed. Oriented to room, staff, and plan of care.     Pt stated she had irreg

## 2021-06-07 DIAGNOSIS — Z34.90 PREGNANCY: Primary | ICD-10-CM

## 2021-06-07 NOTE — TELEPHONE ENCOUNTER
Pt scheduled a video visit with Dr. Federico Cevallos at 11:30 am, she is on bed rest due to early pregnancy

## 2021-06-08 NOTE — PROGRESS NOTES
Jessenia Baird is a 31year old female. Reason for video visit:  F/u medication  This visit is conducted using Telemedicine with live, interactive video and audio. HPI:   Currently 35+ weeks pregnant.   Recently diagnosed with cholestasis and is

## 2021-06-18 ENCOUNTER — LAB ENCOUNTER (OUTPATIENT)
Dept: LAB | Age: 36
End: 2021-06-18
Attending: OBSTETRICS & GYNECOLOGY
Payer: COMMERCIAL

## 2021-06-18 DIAGNOSIS — Z34.90 PREGNANCY: ICD-10-CM

## 2021-06-21 ENCOUNTER — APPOINTMENT (OUTPATIENT)
Dept: OBGYN CLINIC | Facility: HOSPITAL | Age: 36
End: 2021-06-21
Payer: COMMERCIAL

## 2021-06-21 ENCOUNTER — ANESTHESIA EVENT (OUTPATIENT)
Dept: OBGYN UNIT | Facility: HOSPITAL | Age: 36
End: 2021-06-21
Payer: COMMERCIAL

## 2021-06-21 ENCOUNTER — HOSPITAL ENCOUNTER (INPATIENT)
Facility: HOSPITAL | Age: 36
LOS: 2 days | Discharge: HOME | End: 2021-06-23
Attending: OBSTETRICS & GYNECOLOGY | Admitting: OBSTETRICS & GYNECOLOGY
Payer: COMMERCIAL

## 2021-06-21 ENCOUNTER — ANESTHESIA (OUTPATIENT)
Dept: OBGYN UNIT | Facility: HOSPITAL | Age: 36
End: 2021-06-21
Payer: COMMERCIAL

## 2021-06-21 PROCEDURE — 86780 TREPONEMA PALLIDUM: CPT | Performed by: OBSTETRICS & GYNECOLOGY

## 2021-06-21 PROCEDURE — 3E0P7VZ INTRODUCTION OF HORMONE INTO FEMALE REPRODUCTIVE, VIA NATURAL OR ARTIFICIAL OPENING: ICD-10-PCS | Performed by: OBSTETRICS & GYNECOLOGY

## 2021-06-21 PROCEDURE — 85025 COMPLETE CBC W/AUTO DIFF WBC: CPT | Performed by: OBSTETRICS & GYNECOLOGY

## 2021-06-21 PROCEDURE — 86850 RBC ANTIBODY SCREEN: CPT | Performed by: OBSTETRICS & GYNECOLOGY

## 2021-06-21 PROCEDURE — 86901 BLOOD TYPING SEROLOGIC RH(D): CPT | Performed by: OBSTETRICS & GYNECOLOGY

## 2021-06-21 PROCEDURE — 10907ZC DRAINAGE OF AMNIOTIC FLUID, THERAPEUTIC FROM PRODUCTS OF CONCEPTION, VIA NATURAL OR ARTIFICIAL OPENING: ICD-10-PCS | Performed by: OBSTETRICS & GYNECOLOGY

## 2021-06-21 PROCEDURE — 86900 BLOOD TYPING SEROLOGIC ABO: CPT | Performed by: OBSTETRICS & GYNECOLOGY

## 2021-06-21 PROCEDURE — 3E033VJ INTRODUCTION OF OTHER HORMONE INTO PERIPHERAL VEIN, PERCUTANEOUS APPROACH: ICD-10-PCS | Performed by: OBSTETRICS & GYNECOLOGY

## 2021-06-21 PROCEDURE — 82728 ASSAY OF FERRITIN: CPT | Performed by: OBSTETRICS & GYNECOLOGY

## 2021-06-21 RX ORDER — ONDANSETRON 2 MG/ML
4 INJECTION INTRAMUSCULAR; INTRAVENOUS EVERY 6 HOURS PRN
Status: DISCONTINUED | OUTPATIENT
Start: 2021-06-21 | End: 2021-06-22 | Stop reason: HOSPADM

## 2021-06-21 RX ORDER — CEFAZOLIN SODIUM/WATER 2 G/20 ML
2 SYRINGE (ML) INTRAVENOUS ONCE
Status: COMPLETED | OUTPATIENT
Start: 2021-06-21 | End: 2021-06-21

## 2021-06-21 RX ORDER — DOCUSATE SODIUM 100 MG/1
100 CAPSULE, LIQUID FILLED ORAL
Status: DISCONTINUED | OUTPATIENT
Start: 2021-06-21 | End: 2021-06-23

## 2021-06-21 RX ORDER — BUPIVACAINE HCL/0.9 % NACL/PF 0.25 %
5 PLASTIC BAG, INJECTION (ML) EPIDURAL AS NEEDED
Status: DISCONTINUED | OUTPATIENT
Start: 2021-06-21 | End: 2021-06-22

## 2021-06-21 RX ORDER — CARBOPROST TROMETHAMINE 250 UG/ML
INJECTION, SOLUTION INTRAMUSCULAR
Status: COMPLETED
Start: 2021-06-21 | End: 2021-06-21

## 2021-06-21 RX ORDER — IBUPROFEN 600 MG/1
600 TABLET ORAL EVERY 6 HOURS
Status: DISCONTINUED | OUTPATIENT
Start: 2021-06-21 | End: 2021-06-23

## 2021-06-21 RX ORDER — TERBUTALINE SULFATE 1 MG/ML
0.25 INJECTION, SOLUTION SUBCUTANEOUS AS NEEDED
Status: DISCONTINUED | OUTPATIENT
Start: 2021-06-21 | End: 2021-06-22 | Stop reason: HOSPADM

## 2021-06-21 RX ORDER — MELATONIN
325
COMMUNITY
End: 2021-10-14 | Stop reason: ALTCHOICE

## 2021-06-21 RX ORDER — ACETAMINOPHEN 10 MG/ML
1000 INJECTION, SOLUTION INTRAVENOUS EVERY 6 HOURS PRN
Status: DISCONTINUED | OUTPATIENT
Start: 2021-06-21 | End: 2021-06-21

## 2021-06-21 RX ORDER — TRISODIUM CITRATE DIHYDRATE AND CITRIC ACID MONOHYDRATE 500; 334 MG/5ML; MG/5ML
30 SOLUTION ORAL AS NEEDED
Status: DISCONTINUED | OUTPATIENT
Start: 2021-06-21 | End: 2021-06-22 | Stop reason: HOSPADM

## 2021-06-21 RX ORDER — TRANEXAMIC ACID 10 MG/ML
INJECTION, SOLUTION INTRAVENOUS
Status: COMPLETED
Start: 2021-06-21 | End: 2021-06-21

## 2021-06-21 RX ORDER — MISOPROSTOL 200 UG/1
TABLET ORAL
Status: COMPLETED
Start: 2021-06-21 | End: 2021-06-21

## 2021-06-21 RX ORDER — IBUPROFEN 600 MG/1
600 TABLET ORAL EVERY 6 HOURS PRN
Status: DISCONTINUED | OUTPATIENT
Start: 2021-06-21 | End: 2021-06-21 | Stop reason: ALTCHOICE

## 2021-06-21 RX ORDER — METHYLERGONOVINE MALEATE 0.2 MG/ML
INJECTION INTRAVENOUS
Status: COMPLETED
Start: 2021-06-21 | End: 2021-06-21

## 2021-06-21 RX ORDER — ACETAMINOPHEN 10 MG/ML
1000 INJECTION, SOLUTION INTRAVENOUS ONCE
Status: COMPLETED | OUTPATIENT
Start: 2021-06-21 | End: 2021-06-21

## 2021-06-21 RX ORDER — URSODIOL 300 MG/1
CAPSULE ORAL
COMMUNITY
Start: 2021-06-01 | End: 2021-10-14 | Stop reason: ALTCHOICE

## 2021-06-21 RX ORDER — SIMETHICONE 80 MG
80 TABLET,CHEWABLE ORAL 3 TIMES DAILY PRN
Status: DISCONTINUED | OUTPATIENT
Start: 2021-06-21 | End: 2021-06-23

## 2021-06-21 RX ORDER — ACETAMINOPHEN 325 MG/1
650 TABLET ORAL EVERY 6 HOURS PRN
Status: DISCONTINUED | OUTPATIENT
Start: 2021-06-21 | End: 2021-06-23

## 2021-06-21 RX ORDER — ZOLPIDEM TARTRATE 5 MG/1
5 TABLET ORAL NIGHTLY PRN
Status: DISCONTINUED | OUTPATIENT
Start: 2021-06-21 | End: 2021-06-23

## 2021-06-21 RX ORDER — ACETAMINOPHEN 500 MG
500 TABLET ORAL EVERY 6 HOURS PRN
Status: DISCONTINUED | OUTPATIENT
Start: 2021-06-21 | End: 2021-06-21

## 2021-06-21 RX ORDER — SODIUM CHLORIDE, SODIUM LACTATE, POTASSIUM CHLORIDE, CALCIUM CHLORIDE 600; 310; 30; 20 MG/100ML; MG/100ML; MG/100ML; MG/100ML
INJECTION, SOLUTION INTRAVENOUS CONTINUOUS
Status: DISCONTINUED | OUTPATIENT
Start: 2021-06-21 | End: 2021-06-22 | Stop reason: HOSPADM

## 2021-06-21 RX ORDER — DEXTROSE, SODIUM CHLORIDE, SODIUM LACTATE, POTASSIUM CHLORIDE, AND CALCIUM CHLORIDE 5; .6; .31; .03; .02 G/100ML; G/100ML; G/100ML; G/100ML; G/100ML
INJECTION, SOLUTION INTRAVENOUS AS NEEDED
Status: DISCONTINUED | OUTPATIENT
Start: 2021-06-21 | End: 2021-06-22 | Stop reason: HOSPADM

## 2021-06-21 RX ORDER — NALBUPHINE HCL 10 MG/ML
2.5 AMPUL (ML) INJECTION
Status: DISCONTINUED | OUTPATIENT
Start: 2021-06-21 | End: 2021-06-22

## 2021-06-21 RX ORDER — BISACODYL 10 MG
10 SUPPOSITORY, RECTAL RECTAL ONCE AS NEEDED
Status: DISCONTINUED | OUTPATIENT
Start: 2021-06-21 | End: 2021-06-23

## 2021-06-21 RX ORDER — AMMONIA INHALANTS 0.04 G/.3ML
0.3 INHALANT RESPIRATORY (INHALATION) AS NEEDED
Status: DISCONTINUED | OUTPATIENT
Start: 2021-06-21 | End: 2021-06-22 | Stop reason: HOSPADM

## 2021-06-21 RX ORDER — ACETAMINOPHEN 10 MG/ML
INJECTION, SOLUTION INTRAVENOUS
Status: DISPENSED
Start: 2021-06-21 | End: 2021-06-22

## 2021-06-21 NOTE — PROGRESS NOTES
Pt is a 31year old female admitted to   Patient presents with:  Scheduled Induction    Pt is a 37w3d intrauterine pregnancy    History obtained, Pt oriented to room, staff, and plan of care.

## 2021-06-21 NOTE — ANESTHESIA PROCEDURE NOTES
Labor Analgesia  Performed by: Olesya Olea MD  Authorized by: Olesya Olea MD       General Information and Staff    Start Time:   Anesthesiologist: Olesya Olea MD  Performed by:   Anesthesiologist  Patient Location:  OB  Site Identificatio

## 2021-06-21 NOTE — H&P
[facility]    PATIENT'S NAME: VALE Villalba   ATTENDING PHYSICIAN: Batsheva Charlton M.D.    PATIENT ACCOUNT#:   [de-identified]    LOCATION:  [address] [place] [address]  MEDICAL RECORD #:   [id number]       YOB: 1985  ADMISSION DATE:       06 allergy. SOCIAL HISTORY:  No tobacco, no EtOH, no drugs.     FAMILY HISTORY:  Breast cancer in her aunt diagnosed at age 61, Parkinson's in her paternal grandmother age 72, elevated cholesterol in her mother and her father, skeletal disorder in her fathe

## 2021-06-21 NOTE — ANESTHESIA PREPROCEDURE EVALUATION
PRE-OP EVALUATION    Patient Name: Yolande Alaniz    Admit Diagnosis: Pregnancy    Pre-op Diagnosis: * No pre-op diagnosis entered *        Anesthesia Procedure: LABOR ANALGESIA    * No surgeons found in log *    Pre-op vitals reviewed.   Temp: 98.7 ° Rfl: 3, 6/20/2021 at 2000  Multiple Vitamin (MULTI-VITAMIN DAILY) Oral Tab, Take 1 tablet by mouth daily. , Disp: , Rfl: , 6/21/2021 at 0600  Cholecalciferol 5000 units Oral Tab, Take 1 tablet by mouth daily.  Pt stating taking 2000 IU , Disp: , Rfl: , 6/21/

## 2021-06-21 NOTE — PROGRESS NOTES
[facility] Note:  Acetaminophen Therapeutic Duplication      Yolande Alaniz is a(n) 31year old patient who has been prescribed both IV acetaminophen (Ofirmev) and PO acetaminophen .  PO APAP was discontinued per P&T approved protocol for duplicate

## 2021-06-22 PROCEDURE — 85025 COMPLETE CBC W/AUTO DIFF WBC: CPT | Performed by: OBSTETRICS & GYNECOLOGY

## 2021-06-22 RX ORDER — CEFAZOLIN SODIUM/WATER 2 G/20 ML
2 SYRINGE (ML) INTRAVENOUS EVERY 8 HOURS
Status: COMPLETED | OUTPATIENT
Start: 2021-06-22 | End: 2021-06-22

## 2021-06-22 NOTE — L&D DELIVERY NOTE
[facility]    PATIENT'S NAME: VALE Blake   ATTENDING PHYSICIAN: Randolph Espinal M.D.    PATIENT ACCOUNT #: [de-identified] LOCATION:  [address] [place] [address]   MEDICAL RECORD #: [id number] YOB: 1985   ADMISSION DATE: 06/21/2021 DELIVER elevated temperature in labor, it was 100.6. She was also noted to have a URI. She was given a dose of Ancef 2 g IV piggyback and then given a dose of 1000 mg of IV Tylenol. Her temperature did respond to this and did improve.     ASSESSMENT:    1.   A 3

## 2021-06-22 NOTE — PROGRESS NOTES
Pt callled RN to room. Pt c/o shaking, feeling cold, nausea, and being anxious. Pt's axillary Temp at 1800 was 98.7. FHR indeterminate. Pt moving about in bed from side to side. Pt placed sitiing @ 1800.   Pt continues to have large amounts of clear na

## 2021-06-22 NOTE — PROGRESS NOTES
Pt transferred to Mother Baby room [phone] in stable condition. Report given to [facility]. Infant transferred with mother in stable condition. Infant brought down from 2nd floor nursery to patient by MB RN.

## 2021-06-22 NOTE — PROGRESS NOTES
Patient resting quietly. Somewhat anxious but doing well.     Tmax =100.6     EFM: FHR baseline 170  Moderate variability with fetal scalp stimulaion  Mild CTX every 2-3 minutes    CVX; 100%/7-8cm/-1 station    A: IUP 37 3/7 weeks  Cholestasis  Elevated tem

## 2021-06-22 NOTE — L&D DELIVERY NOTE
Augustin, Girl [[id number]]    Labor Events     labor?: No   steroids?: Full Course  Antibiotics received during labor?: Yes  Antibiotics (enter # doses in comment):  (Comment: Ancef 2g)  Rupture date/time: 2021 1715     Rupture type: AR Grimace Cry or active withdrawal    Muscle tone Limp Some flexion Active motion    Respiratory effort Absent Weak cry; hypoventilation Good, crying              1 Minute:  5 Minute:  10 Minute:  15 Minute:  20 Minute:    Skin color: 1  1       Heart rate:

## 2021-06-22 NOTE — PROGRESS NOTES
[facility]  Post-Partum Vaginal Delivery Progress Note    Abran German Patient Status:  Inpatient    1985 MRN [id number]   [facility] ([place], [place]) 1SW-J Attending Carmen Moulton MD   Hosp Day # 1 PCP Miryam Friedman MD     SUBJECTIVE

## 2021-06-22 NOTE — PROGRESS NOTES
Labor Analgesia Follow Up Note    Patient underwent epidural anesthesia for labor analgesia,    Placenta Date/Time: 6/21/2021 11:07 PM    Delivery Date/Time[de-identified] 6/21/2021  11:02 PM    /63 (BP Location: Left arm)   Pulse 69   Temp 98.2 °F (36.8 °C) (Ora

## 2021-06-23 VITALS
WEIGHT: 158 LBS | RESPIRATION RATE: 18 BRPM | SYSTOLIC BLOOD PRESSURE: 89 MMHG | TEMPERATURE: 98 F | OXYGEN SATURATION: 91 % | BODY MASS INDEX: 28 KG/M2 | HEART RATE: 63 BPM | HEIGHT: 63 IN | DIASTOLIC BLOOD PRESSURE: 58 MMHG

## 2021-06-23 PROBLEM — D64.9 NORMOCYTIC ANEMIA: Status: RESOLVED | Noted: 2019-03-25 | Resolved: 2021-06-23

## 2021-06-23 PROCEDURE — 85025 COMPLETE CBC W/AUTO DIFF WBC: CPT | Performed by: OBSTETRICS & GYNECOLOGY

## 2021-06-23 NOTE — CM/SW NOTE
21 1100   Referral Data   Referral Source Nurse   Referral Reason Counseling/support;Psychosocial assessment   OTHER   Post-partum risk assessment score 8     SW received order to meet with pt based on her score of 8 on the [facility]  Dep

## 2021-06-23 NOTE — PROGRESS NOTES
[facility]  Post-Partum Vaginal Delivery Progress Note    Terrence Patrick Patient Status:  Inpatient    1985 MRN [id number]   [facility] 1SW-J Attending Jonathan Rouse MD   Hosp Day # 2 PCP Alcides Ta MD     SUBJECTIVE

## 2021-06-28 ENCOUNTER — APPOINTMENT (OUTPATIENT)
Dept: INTERVENTIONAL RADIOLOGY/VASCULAR | Facility: HOSPITAL | Age: 36
DRG: 769 | End: 2021-06-28
Attending: OBSTETRICS & GYNECOLOGY
Payer: COMMERCIAL

## 2021-06-28 ENCOUNTER — TELEPHONE (OUTPATIENT)
Dept: OBGYN UNIT | Facility: HOSPITAL | Age: 36
End: 2021-06-28

## 2021-06-28 ENCOUNTER — HOSPITAL ENCOUNTER (INPATIENT)
Facility: HOSPITAL | Age: 36
LOS: 1 days | Discharge: HOME | DRG: 769 | End: 2021-06-29
Attending: OBSTETRICS & GYNECOLOGY | Admitting: OBSTETRICS & GYNECOLOGY
Payer: COMMERCIAL

## 2021-06-28 PROCEDURE — 86920 COMPATIBILITY TEST SPIN: CPT

## 2021-06-28 PROCEDURE — 75736 ARTERY X-RAYS PELVIS: CPT

## 2021-06-28 PROCEDURE — 86850 RBC ANTIBODY SCREEN: CPT | Performed by: OBSTETRICS & GYNECOLOGY

## 2021-06-28 PROCEDURE — 85025 COMPLETE CBC W/AUTO DIFF WBC: CPT | Performed by: OBSTETRICS & GYNECOLOGY

## 2021-06-28 PROCEDURE — 86900 BLOOD TYPING SEROLOGIC ABO: CPT | Performed by: OBSTETRICS & GYNECOLOGY

## 2021-06-28 PROCEDURE — B41JYZZ FLUOROSCOPY OF OTHER LOWER ARTERIES USING OTHER CONTRAST: ICD-10-PCS | Performed by: RADIOLOGY

## 2021-06-28 PROCEDURE — 37244 VASC EMBOLIZE/OCCLUDE BLEED: CPT

## 2021-06-28 PROCEDURE — B410YZZ FLUOROSCOPY OF ABDOMINAL AORTA USING OTHER CONTRAST: ICD-10-PCS | Performed by: RADIOLOGY

## 2021-06-28 PROCEDURE — 36247 INS CATH ABD/L-EXT ART 3RD: CPT

## 2021-06-28 PROCEDURE — 04LE3DT OCCLUSION OF RIGHT UTERINE ARTERY WITH INTRALUMINAL DEVICE, PERCUTANEOUS APPROACH: ICD-10-PCS | Performed by: RADIOLOGY

## 2021-06-28 PROCEDURE — 80053 COMPREHEN METABOLIC PANEL: CPT | Performed by: OBSTETRICS & GYNECOLOGY

## 2021-06-28 PROCEDURE — 76937 US GUIDE VASCULAR ACCESS: CPT

## 2021-06-28 PROCEDURE — 04LF3DU OCCLUSION OF LEFT UTERINE ARTERY WITH INTRALUMINAL DEVICE, PERCUTANEOUS APPROACH: ICD-10-PCS | Performed by: RADIOLOGY

## 2021-06-28 PROCEDURE — 85384 FIBRINOGEN ACTIVITY: CPT | Performed by: OBSTETRICS & GYNECOLOGY

## 2021-06-28 PROCEDURE — 85730 THROMBOPLASTIN TIME PARTIAL: CPT | Performed by: OBSTETRICS & GYNECOLOGY

## 2021-06-28 PROCEDURE — 85610 PROTHROMBIN TIME: CPT | Performed by: OBSTETRICS & GYNECOLOGY

## 2021-06-28 PROCEDURE — 86901 BLOOD TYPING SEROLOGIC RH(D): CPT | Performed by: OBSTETRICS & GYNECOLOGY

## 2021-06-28 PROCEDURE — 86780 TREPONEMA PALLIDUM: CPT | Performed by: OBSTETRICS & GYNECOLOGY

## 2021-06-28 RX ORDER — DIPHENHYDRAMINE HYDROCHLORIDE 50 MG/ML
INJECTION INTRAMUSCULAR; INTRAVENOUS
Status: COMPLETED
Start: 2021-06-28 | End: 2021-06-28

## 2021-06-28 RX ORDER — HEPARIN SODIUM 5000 [USP'U]/ML
INJECTION, SOLUTION INTRAVENOUS; SUBCUTANEOUS
Status: COMPLETED
Start: 2021-06-28 | End: 2021-06-28

## 2021-06-28 RX ORDER — TRANEXAMIC ACID 10 MG/ML
1000 INJECTION, SOLUTION INTRAVENOUS ONCE
Status: COMPLETED | OUTPATIENT
Start: 2021-06-28 | End: 2021-06-28

## 2021-06-28 RX ORDER — SODIUM CHLORIDE 9 MG/ML
INJECTION, SOLUTION INTRAVENOUS ONCE
Status: COMPLETED | OUTPATIENT
Start: 2021-06-28 | End: 2021-06-28

## 2021-06-28 RX ORDER — MIDAZOLAM HYDROCHLORIDE 1 MG/ML
INJECTION INTRAMUSCULAR; INTRAVENOUS
Status: COMPLETED
Start: 2021-06-28 | End: 2021-06-28

## 2021-06-28 RX ORDER — ACETAMINOPHEN 325 MG/1
650 TABLET ORAL EVERY 6 HOURS PRN
Status: DISCONTINUED | OUTPATIENT
Start: 2021-06-28 | End: 2021-06-29

## 2021-06-28 RX ORDER — LIDOCAINE HYDROCHLORIDE 10 MG/ML
INJECTION, SOLUTION INFILTRATION; PERINEURAL
Status: COMPLETED
Start: 2021-06-28 | End: 2021-06-28

## 2021-06-28 RX ORDER — SODIUM CHLORIDE, SODIUM LACTATE, POTASSIUM CHLORIDE, CALCIUM CHLORIDE 600; 310; 30; 20 MG/100ML; MG/100ML; MG/100ML; MG/100ML
INJECTION, SOLUTION INTRAVENOUS CONTINUOUS
Status: DISCONTINUED | OUTPATIENT
Start: 2021-06-28 | End: 2021-06-29

## 2021-06-28 RX ORDER — DOCUSATE SODIUM 100 MG/1
100 CAPSULE, LIQUID FILLED ORAL 2 TIMES DAILY
Status: DISCONTINUED | OUTPATIENT
Start: 2021-06-28 | End: 2021-06-29

## 2021-06-28 RX ORDER — TRANEXAMIC ACID 10 MG/ML
INJECTION, SOLUTION INTRAVENOUS
Status: COMPLETED
Start: 2021-06-28 | End: 2021-06-28

## 2021-06-28 NOTE — PROGRESS NOTES
Reviewed self and infant care w / mom, she verbalizes understanding of instructions reviewed. Encourage to follow up w/ MDs as directed and w/ questions/concerns.  Denies ppd or bb, EPDS = 8,  but care reviewed, remains on zoloft, prescribed by PCP

## 2021-06-29 VITALS
DIASTOLIC BLOOD PRESSURE: 76 MMHG | RESPIRATION RATE: 14 BRPM | SYSTOLIC BLOOD PRESSURE: 108 MMHG | TEMPERATURE: 98 F | HEART RATE: 72 BPM | OXYGEN SATURATION: 97 %

## 2021-06-29 PROCEDURE — 85025 COMPLETE CBC W/AUTO DIFF WBC: CPT | Performed by: OBSTETRICS & GYNECOLOGY

## 2021-06-29 RX ORDER — TRANEXAMIC ACID 10 MG/ML
1000 INJECTION, SOLUTION INTRAVENOUS ONCE
Status: DISCONTINUED | OUTPATIENT
Start: 2021-06-29 | End: 2021-06-29

## 2021-06-29 NOTE — H&P
OB history and Physical    Syl Gates Patient Status:  Observation    1985 MRN [id number]   Location [facility] Attending Mohsen Booker MD, MD   Hosp Day # 0 PCP Andi Berry MD           Subjective:     Luz Snyder on each hand, 3 toes on each foot   • High Cholesterol Mother    • Other (Other) Paternal Grandmother         Parkinson's   • Depression Paternal Grandfather    • Blood Disorder Neg        Social History    Tobacco Use      Smoking status: Never Smoker office done today- showed uterus 13.6cm x8.8 cm x9.6 cm with multiple clot like echoes seen within the uterus. At the posterior left aspect of the endometrium there are prominent vessels. Pulse wave shows venous and arterial blood flow.          Assessmen

## 2021-06-29 NOTE — PROGRESS NOTES
[facility]  Post-Partum Vaginal Delivery Progress Note    Abran German Patient Status:  Inpatient    1985 MRN [id number]   [facility] 7NE-A Attending Jim Bustamante MD, MD   [facility] Day # 1 PCP Miraym Friedman MD     [address]

## 2021-06-29 NOTE — PLAN OF CARE
Assumed care of pt. Bleeding is minimal, pads & mesh panties given. Pumping in room. venifer given/ pt discharged home.

## 2021-06-29 NOTE — PLAN OF CARE
Pt arrived from Radiology around 2230  Alert & oriented x4. Tele; NSR/SB.   RA  R groin site C/D/I  C/o 3/10 left-sided lower back pain; some relief with Prn Tylenol  Adequate UO  Minimal vaginal bleeding  IVF infusing  Breast milk stored in room on ice

## 2021-06-29 NOTE — PROGRESS NOTES
Pt is a 31year old female admitted to TRG4/TRG4-A, Patient presents with:  Vaginal Bleeding: Postpartum bleeding     Patient sent from Dr. Eli Duong office with postpartum bleeding. Patient had a  6 and was a PPH following delivery.  Patient st

## 2021-06-29 NOTE — PROGRESS NOTES
Patient was reevaluated after her [place] and appeared to be stable. Her bleeding appeared to be minimal and her vital signs were stable. She was moved to her room.

## 2021-06-29 NOTE — IMAGING NOTE
Pt seen early this am.  Sitting up and pumping. No complaints. Minimal bleeding over night. Enjoying \"massage\" from SCDs.   Abd-soft, NT  Groin-no hematoma  AM labs pending  A/P:  Delayed post partum hemorrhage s/p [place]  Favorable progress overnight  CP

## 2021-06-29 NOTE — PAYOR COMM NOTE
--------------  ADMISSION REVIEW     Payor: [address] [address]  Subscriber #:  [id number]  Authorization Number: [id number]    Admit date: 6/28/21  Admit time: 10:16 PM       Admitting Physician: Luna Brown MD  Attending Physician:  Sruthi Templeton   cholestasis of pregnancy   • Hyperemesis gravidarum     • Iron deficiency anemia of pregnancy 3/25/2019   •  labor                 Past Surgical History:   Procedure Laterality Date   • LEEP        from HPV   • WISDOM TEETH REMOVED        Cervix;  Os is dilated with blood and clots coming out of the cervix  Uterus: fundus mildly tender to exam     Ext: no edema, no evidence of dvt     Lab/Data Reviewed:        Recent Labs   Lab 06/22/21  0511 06/23/21  0652 06/28/21  1944   RBC 3.21* 3.02* 3 Patrick Kenney MD, MD   Hosp Day # 1 PCP Marlin Lopez MD      SUBJECTIVE:     Postpartum Day # 8  s/p vaginal delivery with immediate and delayed PPH     The patient without complaints. She is able to ambulate and void normal, minimal bleeding.   Pain is Oral Daryl Prater RN    6/28/2021 2325 Given 100 mg Oral Kael Mosley RN      iodixanol (VISIPAQUE) 320 MG/ML injection 200 mL     Date Action Dose Route User    6/28/2021 2146 Given 100 mL Injection Veena Daft      iron sucrose (VENOFER) IV

## 2021-06-29 NOTE — PROCEDURES
D/w Dr. Milla Mcclellan. Post partum hemorrhage. Appearance of active bleeding from left UA branches. Oral gelfoam embo. Comp-none.

## 2021-06-30 NOTE — PAYOR COMM NOTE
--------------  DISCHARGE REVIEW    Payor: [address]  Subscriber #:  [id number]  Authorization Number: [id number]    Admit date: 6/28/21  Admit time:  10:16 PM  Discharge Date: 6/29/2021  4:30 PM     Admitting Physician: Tung Heath MD

## 2021-07-02 LAB — BLOOD TYPE BARCODE: 5100

## 2021-09-28 RX ORDER — SERTRALINE HYDROCHLORIDE 25 MG/1
TABLET, FILM COATED ORAL
Qty: 90 TABLET | Refills: 2 | OUTPATIENT
Start: 2021-09-28

## 2021-09-28 NOTE — TELEPHONE ENCOUNTER
Refill request for:    Requested Prescriptions     Pending Prescriptions Disp Refills   • SERTRALINE 25 MG Oral Tab [Pharmacy Med Name: SERTRALINE HCL 25 MG TABLET] 90 tablet 2     Sig: TAKE 1 TABLET BY MOUTH EVERY DAY        Last Prescribed Quantity Refil

## 2021-10-14 ENCOUNTER — OFFICE VISIT (OUTPATIENT)
Dept: FAMILY MEDICINE CLINIC | Facility: CLINIC | Age: 36
End: 2021-10-14
Payer: COMMERCIAL

## 2021-10-14 VITALS
HEART RATE: 68 BPM | RESPIRATION RATE: 16 BRPM | HEIGHT: 63 IN | TEMPERATURE: 97 F | BODY MASS INDEX: 24.47 KG/M2 | DIASTOLIC BLOOD PRESSURE: 78 MMHG | WEIGHT: 138.13 LBS | SYSTOLIC BLOOD PRESSURE: 100 MMHG

## 2021-10-14 DIAGNOSIS — L29.9 PRURITUS: Primary | ICD-10-CM

## 2021-10-14 PROBLEM — Z34.90 PREGNANCY: Status: RESOLVED | Noted: 2021-06-03 | Resolved: 2021-10-14

## 2021-10-14 PROBLEM — Z34.90 PREGNANCY (HCC): Status: RESOLVED | Noted: 2021-06-03 | Resolved: 2021-10-14

## 2021-10-14 PROCEDURE — 99213 OFFICE O/P EST LOW 20 MIN: CPT | Performed by: NURSE PRACTITIONER

## 2021-10-14 PROCEDURE — 3078F DIAST BP <80 MM HG: CPT | Performed by: NURSE PRACTITIONER

## 2021-10-14 PROCEDURE — 3008F BODY MASS INDEX DOCD: CPT | Performed by: NURSE PRACTITIONER

## 2021-10-14 PROCEDURE — 3074F SYST BP LT 130 MM HG: CPT | Performed by: NURSE PRACTITIONER

## 2021-10-14 NOTE — PROGRESS NOTES
Patient presents with:  Rash: itching on hands and feet       HPI:  Presents with approx 5 day history of interimttent itching sensation to bilat hands and feet, specifically palms and plantar/medial aspect of feet.  Stated has exact same symptoms during re Normocephalic and atraumatic. Cardiovascular: Normal rate, regular rhythm. No murmur. Pulmonary/Chest: No respiratory distress. Effort normal. Breath sounds clear bilaterally.  No wheezes, rhonchi or rales  Abd: soft, non-distended, non-TTP, w/o guardi

## 2021-10-19 ENCOUNTER — TELEPHONE (OUTPATIENT)
Dept: FAMILY MEDICINE CLINIC | Facility: CLINIC | Age: 36
End: 2021-10-19

## 2021-10-19 NOTE — TELEPHONE ENCOUNTER
Pt calling she got her lab results and she is very concerned about her results and is hoping to talk to someone asap about her results.

## 2021-10-20 NOTE — TELEPHONE ENCOUNTER
Called LMOM to call back to to discuss labs and give referral information   Shamir Arizmendi  [facility] [address] [facility] [phone]

## 2021-12-07 ENCOUNTER — OFFICE VISIT (OUTPATIENT)
Dept: RHEUMATOLOGY | Facility: CLINIC | Age: 36
End: 2021-12-07
Payer: COMMERCIAL

## 2021-12-07 VITALS
SYSTOLIC BLOOD PRESSURE: 100 MMHG | HEART RATE: 79 BPM | WEIGHT: 134 LBS | HEIGHT: 63.5 IN | OXYGEN SATURATION: 99 % | DIASTOLIC BLOOD PRESSURE: 70 MMHG | BODY MASS INDEX: 23.45 KG/M2

## 2021-12-07 DIAGNOSIS — R76.8 POSITIVE ANA (ANTINUCLEAR ANTIBODY): Primary | ICD-10-CM

## 2021-12-07 DIAGNOSIS — G56.23 CUBITAL TUNNEL SYNDROME OF BOTH UPPER EXTREMITIES: ICD-10-CM

## 2021-12-07 DIAGNOSIS — L29.9 ITCHY SKIN: ICD-10-CM

## 2021-12-07 DIAGNOSIS — Z51.81 THERAPEUTIC DRUG MONITORING: ICD-10-CM

## 2021-12-07 DIAGNOSIS — G25.81 RESTLESS LEG SYNDROME: ICD-10-CM

## 2021-12-07 DIAGNOSIS — G56.21 GUYON SYNDROME, RIGHT: ICD-10-CM

## 2021-12-07 DIAGNOSIS — G62.9 PERIPHERAL POLYNEUROPATHY: ICD-10-CM

## 2021-12-07 PROCEDURE — 3008F BODY MASS INDEX DOCD: CPT | Performed by: INTERNAL MEDICINE

## 2021-12-07 PROCEDURE — 3078F DIAST BP <80 MM HG: CPT | Performed by: INTERNAL MEDICINE

## 2021-12-07 PROCEDURE — 99244 OFF/OP CNSLTJ NEW/EST MOD 40: CPT | Performed by: INTERNAL MEDICINE

## 2021-12-07 PROCEDURE — 3074F SYST BP LT 130 MM HG: CPT | Performed by: INTERNAL MEDICINE

## 2021-12-07 NOTE — PROGRESS NOTES
Rheumatology New Patient Note  =====================================================================================================      Date of visit: 12/7/2021  ? Patient presents with:  Establish Care: New pt, referred by PCP for +VALDEZ.  Labs comple above    Medications:  Sertraline HCl 50 MG Oral Tab, Take 1 tablet (50 mg total) by mouth daily. , Disp: 90 tablet, Rfl: 3  Multiple Vitamin (MULTI-VITAMIN DAILY) Oral Tab, Take 1 tablet by mouth daily. , Disp: , Rfl:       Modified Medications    No medica No LAD, parotid or submandicular gland palpated. CV: RRR, no mrg, S1/S2  PULM: CTAB, no wrr, easy effort  Extremities: No cyanosis, edema or deformities. Neurologic: Strength, CN2-12 grossly intact   Psych: normal affect.    MSK: 28 joint count performe BUNCREA NOT APPLICABLE 60/97/8757    CREATSERUM 0.76 10/15/2021    ANIONGAP 4 06/28/2021    GFRNAA 102 10/15/2021    GFRAA 119 10/15/2021    CA 9.3 10/15/2021    OSMOCALC 287 06/28/2021    ALKPHO 74 10/15/2021    AST 14 10/15/2021    ALT 13 10/15/2021    B pre-clinical autoimmune disease. Will need to further investigate positive VALDEZ with additional evaluations as below. VALDEZ extractable nuclear antigens are negative which may point to a false positive VALDEZ. Plan:   To work up positive VALDEZ (Antinuclear Anti BETA-2 GLYCOPROTEIN I AB,G/M  -     ANTICARDIOLIPIN AB, IGG, QN  -     ANTICARDIOLIPIN AB, IGM, QN  -     CBC WITH DIFFERENTIAL WITH PLATELET  -     COMPLEMENT C3, SERUM  -     COMPLEMENT C4, SERUM  -     C-REACTIVE PROTEIN  -     SED RATE, WESTERGREN (A

## 2022-02-19 ENCOUNTER — TELEPHONE (OUTPATIENT)
Dept: FAMILY MEDICINE CLINIC | Facility: CLINIC | Age: 37
End: 2022-02-19

## 2022-02-19 NOTE — TELEPHONE ENCOUNTER
Today had three BMs with blood in the toiler water  No rectal pain  Denies lightheadedness or dizziness    Sounds like hemorrhoids  Hydrate, soften bowels with stool softener or fiber. Should resolve over time.

## 2022-09-27 ENCOUNTER — TELEPHONE (OUTPATIENT)
Dept: FAMILY MEDICINE CLINIC | Facility: CLINIC | Age: 37
End: 2022-09-27

## 2022-09-27 DIAGNOSIS — Z00.00 LABORATORY EXAMINATION ORDERED AS PART OF A COMPLETE PHYSICAL EXAMINATION: Primary | ICD-10-CM

## 2022-09-27 NOTE — TELEPHONE ENCOUNTER
Please enter lab orders for the patient's upcoming physical appointment. Physical scheduled: Your appointments     Date & Time Appointment Department [facility])    Oct 19, 2022  9:15 AM CDT Physical - Established with LE Rosales [address] [place] [address], [address], [place]  ([address] [place] [address])            Yoana Lui [address] [facility] [phone]         Preferred lab: [facility])     The patient has been notified to complete fasting labs prior to their physical appointment.

## 2022-10-03 ENCOUNTER — PATIENT MESSAGE (OUTPATIENT)
Dept: FAMILY MEDICINE CLINIC | Facility: CLINIC | Age: 37
End: 2022-10-03

## 2022-10-03 NOTE — TELEPHONE ENCOUNTER
From: Corrine Ruvalcaba  To: Anna LE Barnes  Sent: 10/3/2022 9:58 AM CDT  Subject: Blood Work    Hi! I was told that I should have bloodwork done before my visit on Oct. 19th. Who should I contact to make that appoitment? Was paperwork sent over to a specific office? Thank you.

## 2022-12-21 ENCOUNTER — TELEPHONE (OUTPATIENT)
Dept: FAMILY MEDICINE CLINIC | Facility: CLINIC | Age: 37
End: 2022-12-21

## 2022-12-21 DIAGNOSIS — Z00.00 ROUTINE GENERAL MEDICAL EXAMINATION AT A HEALTH CARE FACILITY: Primary | ICD-10-CM

## 2022-12-21 NOTE — TELEPHONE ENCOUNTER
Please enter lab orders for the patient's upcoming physical appointment. Physical scheduled: Your appointments     Date & Time Appointment Department [facility])    Feb 11, 2023 10:00 AM CST Physical - Established with Bess Marcum MD [facility], [address], [place]  ([facility])            [phone]         Preferred lab: [facility])     The patient has been notified to complete fasting labs prior to their physical appointment.

## 2023-01-02 ENCOUNTER — HOSPITAL ENCOUNTER (EMERGENCY)
Facility: HOSPITAL | Age: 38
Discharge: HOME | End: 2023-01-02
Attending: EMERGENCY MEDICINE
Payer: COMMERCIAL

## 2023-01-02 ENCOUNTER — APPOINTMENT (OUTPATIENT)
Dept: GENERAL RADIOLOGY | Facility: HOSPITAL | Age: 38
End: 2023-01-02
Payer: COMMERCIAL

## 2023-01-02 VITALS
SYSTOLIC BLOOD PRESSURE: 107 MMHG | DIASTOLIC BLOOD PRESSURE: 65 MMHG | RESPIRATION RATE: 15 BRPM | TEMPERATURE: 98 F | HEART RATE: 64 BPM | OXYGEN SATURATION: 98 %

## 2023-01-02 DIAGNOSIS — S00.33XA CONTUSION OF NOSE, INITIAL ENCOUNTER: Primary | ICD-10-CM

## 2023-01-02 PROCEDURE — 99283 EMERGENCY DEPT VISIT LOW MDM: CPT

## 2023-01-02 PROCEDURE — 70160 X-RAY EXAM OF NASAL BONES: CPT

## 2023-01-03 NOTE — ED INITIAL ASSESSMENT (HPI)
A&Ox3 ambulatory patient p/w nose injury    Patient's  was passing their child over to the patient and the child hit her in the nose with her head \"I heard a crunch and then a strong wave of pain that brought me to my knees, some blood and drainage\"    No bleeding at this time, +swelling to the affected area    RR even/NL

## 2023-02-04 ENCOUNTER — LABORATORY ENCOUNTER (OUTPATIENT)
Dept: LAB | Age: 38
End: 2023-02-04
Attending: FAMILY MEDICINE
Payer: COMMERCIAL

## 2023-02-04 DIAGNOSIS — Z00.00 ROUTINE GENERAL MEDICAL EXAMINATION AT A HEALTH CARE FACILITY: ICD-10-CM

## 2023-02-04 LAB
ALBUMIN SERPL-MCNC: 3.7 G/DL (ref 3.4–5)
ALBUMIN/GLOB SERPL: 1 {RATIO} (ref 1–2)
ALP LIVER SERPL-CCNC: 60 U/L
ALT SERPL-CCNC: 15 U/L
ANION GAP SERPL CALC-SCNC: 2 MMOL/L (ref 0–18)
AST SERPL-CCNC: 20 U/L (ref 15–37)
BILIRUB SERPL-MCNC: 0.3 MG/DL (ref 0.1–2)
BUN BLD-MCNC: 15 MG/DL (ref 7–18)
CALCIUM BLD-MCNC: 9 MG/DL (ref 8.5–10.1)
CHLORIDE SERPL-SCNC: 106 MMOL/L (ref 98–112)
CHOLEST SERPL-MCNC: 205 MG/DL (ref ?–200)
CO2 SERPL-SCNC: 29 MMOL/L (ref 21–32)
CREAT BLD-MCNC: 0.8 MG/DL
ERYTHROCYTE [DISTWIDTH] IN BLOOD BY AUTOMATED COUNT: 13.4 %
FASTING PATIENT LIPID ANSWER: YES
FASTING STATUS PATIENT QL REPORTED: YES
GFR SERPLBLD BASED ON 1.73 SQ M-ARVRAT: 98 ML/MIN/1.73M2 (ref 60–?)
GLOBULIN PLAS-MCNC: 3.8 G/DL (ref 2.8–4.4)
GLUCOSE BLD-MCNC: 90 MG/DL (ref 70–99)
HCT VFR BLD AUTO: 39.3 %
HDLC SERPL-MCNC: 52 MG/DL (ref 40–59)
HGB BLD-MCNC: 12.5 G/DL
LDLC SERPL CALC-MCNC: 133 MG/DL (ref ?–100)
MCH RBC QN AUTO: 28.3 PG (ref 26–34)
MCHC RBC AUTO-ENTMCNC: 31.8 G/DL (ref 31–37)
MCV RBC AUTO: 89.1 FL
NONHDLC SERPL-MCNC: 153 MG/DL (ref ?–130)
OSMOLALITY SERPL CALC.SUM OF ELEC: 284 MOSM/KG (ref 275–295)
PLATELET # BLD AUTO: 200 10(3)UL (ref 150–450)
POTASSIUM SERPL-SCNC: 3.5 MMOL/L (ref 3.5–5.1)
PROT SERPL-MCNC: 7.5 G/DL (ref 6.4–8.2)
RBC # BLD AUTO: 4.41 X10(6)UL
SODIUM SERPL-SCNC: 137 MMOL/L (ref 136–145)
TRIGL SERPL-MCNC: 111 MG/DL (ref 30–149)
TSI SER-ACNC: 1.16 MIU/ML (ref 0.36–3.74)
VLDLC SERPL CALC-MCNC: 20 MG/DL (ref 0–30)
WBC # BLD AUTO: 5.8 X10(3) UL (ref 4–11)

## 2023-02-04 PROCEDURE — 80053 COMPREHEN METABOLIC PANEL: CPT

## 2023-02-04 PROCEDURE — 80061 LIPID PANEL: CPT

## 2023-02-04 PROCEDURE — 85027 COMPLETE CBC AUTOMATED: CPT

## 2023-02-04 PROCEDURE — 36415 COLL VENOUS BLD VENIPUNCTURE: CPT

## 2023-02-04 PROCEDURE — 84443 ASSAY THYROID STIM HORMONE: CPT

## 2023-02-11 ENCOUNTER — OFFICE VISIT (OUTPATIENT)
Dept: FAMILY MEDICINE CLINIC | Facility: CLINIC | Age: 38
End: 2023-02-11
Payer: COMMERCIAL

## 2023-02-11 VITALS
WEIGHT: 123.63 LBS | BODY MASS INDEX: 21.37 KG/M2 | SYSTOLIC BLOOD PRESSURE: 94 MMHG | RESPIRATION RATE: 14 BRPM | DIASTOLIC BLOOD PRESSURE: 52 MMHG | HEART RATE: 68 BPM | HEIGHT: 63.75 IN

## 2023-02-11 DIAGNOSIS — Z00.00 ROUTINE GENERAL MEDICAL EXAMINATION AT A HEALTH CARE FACILITY: Primary | ICD-10-CM

## 2023-02-11 PROCEDURE — 3074F SYST BP LT 130 MM HG: CPT | Performed by: FAMILY MEDICINE

## 2023-02-11 PROCEDURE — 3078F DIAST BP <80 MM HG: CPT | Performed by: FAMILY MEDICINE

## 2023-02-11 PROCEDURE — 99395 PREV VISIT EST AGE 18-39: CPT | Performed by: FAMILY MEDICINE

## 2023-02-11 PROCEDURE — 3008F BODY MASS INDEX DOCD: CPT | Performed by: FAMILY MEDICINE

## 2023-02-27 ENCOUNTER — TELEPHONE (OUTPATIENT)
Dept: FAMILY MEDICINE CLINIC | Facility: CLINIC | Age: 38
End: 2023-02-27

## 2023-02-27 NOTE — TELEPHONE ENCOUNTER
Pt is calling she is having a swollen gland right side and nasal discharge in that direction and she is saying that it is very uncomfortable. Muscle aches and fever of 100 then she gets chills after the medication is taken to reduce the fever. She started this on Saturday.  Neg Covid

## 2023-02-27 NOTE — TELEPHONE ENCOUNTER
Pt states she got a fever on Saturday (tmax 101). Noticed tenderness and inflammation beneath right ear. Had nasal drainage on right side as well. C/o body aches. Reviewed with pt that sx sound flu-like. Will continue OTC meds, rest and fluids. Patient asked to call office if no improvement in lymph node once illness resolves. She verbalized understanding and agrees with plan.

## 2023-03-06 ENCOUNTER — OFFICE VISIT (OUTPATIENT)
Dept: FAMILY MEDICINE CLINIC | Facility: CLINIC | Age: 38
End: 2023-03-06
Payer: COMMERCIAL

## 2023-03-06 VITALS
HEART RATE: 73 BPM | OXYGEN SATURATION: 98 % | HEIGHT: 63.75 IN | WEIGHT: 122.63 LBS | SYSTOLIC BLOOD PRESSURE: 90 MMHG | DIASTOLIC BLOOD PRESSURE: 62 MMHG | BODY MASS INDEX: 21.19 KG/M2 | TEMPERATURE: 97 F

## 2023-03-06 DIAGNOSIS — J02.0 STREP THROAT: Primary | ICD-10-CM

## 2023-03-06 LAB
CONTROL LINE PRESENT WITH A CLEAR BACKGROUND (YES/NO): YES YES/NO
KIT LOT #: NORMAL NUMERIC
STREP GRP A CUL-SCR: POSITIVE

## 2023-03-06 PROCEDURE — 87880 STREP A ASSAY W/OPTIC: CPT | Performed by: STUDENT IN AN ORGANIZED HEALTH CARE EDUCATION/TRAINING PROGRAM

## 2023-03-06 PROCEDURE — 99213 OFFICE O/P EST LOW 20 MIN: CPT | Performed by: STUDENT IN AN ORGANIZED HEALTH CARE EDUCATION/TRAINING PROGRAM

## 2023-03-06 PROCEDURE — 3074F SYST BP LT 130 MM HG: CPT | Performed by: STUDENT IN AN ORGANIZED HEALTH CARE EDUCATION/TRAINING PROGRAM

## 2023-03-06 PROCEDURE — 3008F BODY MASS INDEX DOCD: CPT | Performed by: STUDENT IN AN ORGANIZED HEALTH CARE EDUCATION/TRAINING PROGRAM

## 2023-03-06 PROCEDURE — 3078F DIAST BP <80 MM HG: CPT | Performed by: STUDENT IN AN ORGANIZED HEALTH CARE EDUCATION/TRAINING PROGRAM

## 2023-03-06 RX ORDER — PENICILLIN V POTASSIUM 500 MG/1
500 TABLET ORAL 2 TIMES DAILY
Qty: 20 TABLET | Refills: 0 | Status: SHIPPED | OUTPATIENT
Start: 2023-03-06 | End: 2023-03-16

## 2023-03-10 ENCOUNTER — TELEPHONE (OUTPATIENT)
Dept: FAMILY MEDICINE CLINIC | Facility: CLINIC | Age: 38
End: 2023-03-10

## 2023-03-10 ENCOUNTER — OFFICE VISIT (OUTPATIENT)
Dept: FAMILY MEDICINE CLINIC | Facility: CLINIC | Age: 38
End: 2023-03-10
Payer: COMMERCIAL

## 2023-03-10 VITALS
TEMPERATURE: 98 F | DIASTOLIC BLOOD PRESSURE: 72 MMHG | HEART RATE: 79 BPM | RESPIRATION RATE: 18 BRPM | HEIGHT: 63 IN | WEIGHT: 120 LBS | OXYGEN SATURATION: 98 % | BODY MASS INDEX: 21.26 KG/M2 | SYSTOLIC BLOOD PRESSURE: 110 MMHG

## 2023-03-10 DIAGNOSIS — R51.9 FACIAL PAIN: ICD-10-CM

## 2023-03-10 DIAGNOSIS — R09.81 NASAL CONGESTION: Primary | ICD-10-CM

## 2023-03-10 PROCEDURE — 3074F SYST BP LT 130 MM HG: CPT | Performed by: PHYSICIAN ASSISTANT

## 2023-03-10 PROCEDURE — 99213 OFFICE O/P EST LOW 20 MIN: CPT | Performed by: PHYSICIAN ASSISTANT

## 2023-03-10 PROCEDURE — 3008F BODY MASS INDEX DOCD: CPT | Performed by: PHYSICIAN ASSISTANT

## 2023-03-10 PROCEDURE — 3078F DIAST BP <80 MM HG: CPT | Performed by: PHYSICIAN ASSISTANT

## 2023-03-10 RX ORDER — AMOXICILLIN AND CLAVULANATE POTASSIUM 875; 125 MG/1; MG/1
1 TABLET, FILM COATED ORAL 2 TIMES DAILY
Qty: 14 TABLET | Refills: 0 | Status: SHIPPED | OUTPATIENT
Start: 2023-03-10 | End: 2023-03-17

## 2023-03-10 NOTE — TELEPHONE ENCOUNTER
Pt is calling because she thinks her strep has now turned into a sinus infection and she is in a lot of pain and would like to know what she can do

## 2023-03-10 NOTE — TELEPHONE ENCOUNTER
Called talked to patient told her she should go to the [facility] to be seen either today or tomorrow for this problem

## 2023-03-11 LAB — SARS-COV-2 RNA RESP QL NAA+PROBE: NOT DETECTED

## 2023-08-23 ENCOUNTER — OFFICE VISIT (OUTPATIENT)
Dept: FAMILY MEDICINE CLINIC | Facility: CLINIC | Age: 38
End: 2023-08-23
Payer: COMMERCIAL

## 2023-08-23 VITALS
HEART RATE: 102 BPM | WEIGHT: 124.38 LBS | OXYGEN SATURATION: 99 % | RESPIRATION RATE: 16 BRPM | SYSTOLIC BLOOD PRESSURE: 120 MMHG | DIASTOLIC BLOOD PRESSURE: 60 MMHG | HEIGHT: 63 IN | BODY MASS INDEX: 22.04 KG/M2

## 2023-08-23 DIAGNOSIS — M54.50 LOW BACK PAIN WITHOUT SCIATICA, UNSPECIFIED BACK PAIN LATERALITY, UNSPECIFIED CHRONICITY: Primary | ICD-10-CM

## 2023-08-23 PROBLEM — L29.9 ITCHY SKIN: Status: RESOLVED | Noted: 2021-12-07 | Resolved: 2023-08-23

## 2023-08-23 PROBLEM — D50.9 IRON DEFICIENCY ANEMIA OF PREGNANCY (HCC): Status: RESOLVED | Noted: 2019-03-25 | Resolved: 2023-08-23

## 2023-08-23 PROBLEM — D50.9 IRON DEFICIENCY ANEMIA OF PREGNANCY: Status: RESOLVED | Noted: 2019-03-25 | Resolved: 2023-08-23

## 2023-08-23 PROBLEM — O99.019 IRON DEFICIENCY ANEMIA OF PREGNANCY: Status: RESOLVED | Noted: 2019-03-25 | Resolved: 2023-08-23

## 2023-08-23 PROBLEM — O99.019 IRON DEFICIENCY ANEMIA OF PREGNANCY (HCC): Status: RESOLVED | Noted: 2019-03-25 | Resolved: 2023-08-23

## 2023-08-23 PROCEDURE — 3078F DIAST BP <80 MM HG: CPT | Performed by: STUDENT IN AN ORGANIZED HEALTH CARE EDUCATION/TRAINING PROGRAM

## 2023-08-23 PROCEDURE — 3008F BODY MASS INDEX DOCD: CPT | Performed by: STUDENT IN AN ORGANIZED HEALTH CARE EDUCATION/TRAINING PROGRAM

## 2023-08-23 PROCEDURE — 99213 OFFICE O/P EST LOW 20 MIN: CPT | Performed by: STUDENT IN AN ORGANIZED HEALTH CARE EDUCATION/TRAINING PROGRAM

## 2023-08-23 PROCEDURE — 3074F SYST BP LT 130 MM HG: CPT | Performed by: STUDENT IN AN ORGANIZED HEALTH CARE EDUCATION/TRAINING PROGRAM

## 2023-11-29 NOTE — PATIENT INSTRUCTIONS
Get bloodwork. If your numbness and tingling in your hands persist at night, you can try wearing a carpal tunnel brace in both hands at night to see if this helps. What is Cubital Tunnel Syndrome?   Cubital tunnel syndrome is a set of symptoms alysa needs device and assist

## 2024-03-26 ENCOUNTER — OFFICE VISIT (OUTPATIENT)
Dept: FAMILY MEDICINE CLINIC | Facility: CLINIC | Age: 39
End: 2024-03-26
Payer: COMMERCIAL

## 2024-03-26 VITALS
BODY MASS INDEX: 21.44 KG/M2 | DIASTOLIC BLOOD PRESSURE: 72 MMHG | OXYGEN SATURATION: 98 % | WEIGHT: 121 LBS | HEIGHT: 63 IN | SYSTOLIC BLOOD PRESSURE: 102 MMHG | TEMPERATURE: 99 F | HEART RATE: 91 BPM | RESPIRATION RATE: 16 BRPM

## 2024-03-26 DIAGNOSIS — J06.9 VIRAL UPPER RESPIRATORY ILLNESS: Primary | ICD-10-CM

## 2024-03-26 PROCEDURE — 3074F SYST BP LT 130 MM HG: CPT | Performed by: NURSE PRACTITIONER

## 2024-03-26 PROCEDURE — 99213 OFFICE O/P EST LOW 20 MIN: CPT | Performed by: NURSE PRACTITIONER

## 2024-03-26 PROCEDURE — 3008F BODY MASS INDEX DOCD: CPT | Performed by: NURSE PRACTITIONER

## 2024-03-26 PROCEDURE — 3078F DIAST BP <80 MM HG: CPT | Performed by: NURSE PRACTITIONER

## 2024-03-26 PROCEDURE — 87635 SARS-COV-2 COVID-19 AMP PRB: CPT | Performed by: NURSE PRACTITIONER

## 2024-03-27 LAB — SARS-COV-2 RNA RESP QL NAA+PROBE: DETECTED

## 2024-04-22 NOTE — TELEPHONE ENCOUNTER
Refill request for:    Requested Prescriptions     Pending Prescriptions Disp Refills    SERTRALINE 50 MG Oral Tab [Pharmacy Med Name: SERTRALINE 50MG TABLETS] 90 tablet 3     Sig: TAKE 1 TABLET(50 MG) BY MOUTH DAILY        Last Prescribed Quantity Refills   02/11/2023 90 3     LOV 8/23/2023     Patient was asked to follow-up in:     Appointment due:     Appointment scheduled: Visit date not found    Medication not on protocol.     Routed to Walker

## 2024-06-19 ENCOUNTER — TELEPHONE (OUTPATIENT)
Dept: FAMILY MEDICINE CLINIC | Facility: CLINIC | Age: 39
End: 2024-06-19

## 2024-06-19 DIAGNOSIS — Z00.00 ROUTINE GENERAL MEDICAL EXAMINATION AT A HEALTH CARE FACILITY: Primary | ICD-10-CM

## 2024-06-19 NOTE — TELEPHONE ENCOUNTER
Please enter lab orders for the patient's upcoming physical appointment.     Physical scheduled:   Your appointments       Date & Time Appointment Department ([place])    Jul 16, 2024 9:30 AM CDT Adult Physical with Leslie Akhtar MD [facility] ([facility])    PLEASE NOTE - Most insurances allow a Complete Physical once every 366 days. Please schedule accordingly.    Please arrive 15 minutes prior to your scheduled appointment. Please also bring your Insurance card, Photo ID, and your medication bottles or a list of your current medication.    If you no longer require this appointment, please contact your physician office to cancel.              [facility] [address]  [address] [address]  [phone]           Preferred lab: [facility] ([facility])     The patient has been notified to complete fasting labs prior to their physical appointment.

## 2024-07-16 ENCOUNTER — OFFICE VISIT (OUTPATIENT)
Dept: FAMILY MEDICINE CLINIC | Facility: CLINIC | Age: 39
End: 2024-07-16
Payer: COMMERCIAL

## 2024-07-16 VITALS
RESPIRATION RATE: 16 BRPM | SYSTOLIC BLOOD PRESSURE: 102 MMHG | WEIGHT: 123.19 LBS | TEMPERATURE: 97 F | BODY MASS INDEX: 21.83 KG/M2 | DIASTOLIC BLOOD PRESSURE: 70 MMHG | HEART RATE: 94 BPM | HEIGHT: 63 IN

## 2024-07-16 DIAGNOSIS — Z12.83 SKIN CANCER SCREENING: ICD-10-CM

## 2024-07-16 DIAGNOSIS — Z00.00 ROUTINE GENERAL MEDICAL EXAMINATION AT A HEALTH CARE FACILITY: Primary | ICD-10-CM

## 2024-07-16 PROCEDURE — 3008F BODY MASS INDEX DOCD: CPT | Performed by: FAMILY MEDICINE

## 2024-07-16 PROCEDURE — 99395 PREV VISIT EST AGE 18-39: CPT | Performed by: FAMILY MEDICINE

## 2024-07-16 PROCEDURE — 3074F SYST BP LT 130 MM HG: CPT | Performed by: FAMILY MEDICINE

## 2024-07-16 PROCEDURE — 3078F DIAST BP <80 MM HG: CPT | Performed by: FAMILY MEDICINE

## 2024-07-16 NOTE — PROGRESS NOTES
HPI:   Vane Ruffin is a 39 year old female who presents for a complete physical exam.  Last pap:  3/2022 with GYN  Last mammogram:  n/a   Menses:  regular  Contraception:  vasectomy  Previous colonoscopy:  n/a  Family hx of breast, ovarian, cervical or colon CA:  Aunt breast  Immunizations:  Tdap:  2019, Flu:  yearly  Occ: homemaker. : yes. Children: son and daughter    Anxiety:  Treated with zoloft 50mg daily.  Hx of PTSD    Wt Readings from Last 6 Encounters:   24 123 lb 3.2 oz (55.9 kg)   24 121 lb (54.9 kg)   23 124 lb 6 oz (56.4 kg)   03/10/23 120 lb (54.4 kg)   23 122 lb 9.6 oz (55.6 kg)   23 123 lb 9.6 oz (56.1 kg)     Body mass index is 21.82 kg/m².     Cholesterol, Total (mg/dL)   Date Value   2023 205 (H)   2019 220     HDL Cholesterol (mg/dL)   Date Value   2023 52   2019 59     LDL Cholesterol (mg/dL)   Date Value   2023 133 (H)        Current Outpatient Medications   Medication Sig Dispense Refill    sertraline 50 MG Oral Tab Take 1 tablet (50 mg total) by mouth daily. 90 tablet 3    Multiple Vitamin (MULTI-VITAMIN DAILY) Oral Tab Take 1 tablet by mouth daily.        Patient Active Problem List   Diagnosis    Postpartum anemia (HCC)    Anxiety    Vitamin D deficiency    Postpartum hemorrhage (HCC)    Positive VALDEZ (antinuclear antibody)    Therapeutic drug monitoring    Peripheral polyneuropathy     Past Medical History:    Anxiety    Disorder of liver    cholestasis of pregnancy    Hyperemesis gravidarum (HCC)    Iron deficiency anemia of pregnancy (HCC)     labor (HCC)      Past Surgical History:   Procedure Laterality Date    Leep      from HPV    Dillon teeth removed  2009    no excessive bleeding      Family History   Problem Relation Age of Onset    Cancer Maternal Aunt 60        breast    High Cholesterol Father     Other (Other) Father         born without fibulas, only has 2 fingers on each hand, 3 toes on  each foot    High Cholesterol Mother     Other (Other) Paternal Grandmother         Parkinson's    Depression Paternal Grandfather     Blood Disorder Neg       Social History:   Social History     Socioeconomic History    Marital status:    Tobacco Use    Smoking status: Never    Smokeless tobacco: Never   Vaping Use    Vaping status: Never Used   Substance and Sexual Activity    Alcohol use: Yes     Alcohol/week: 7.0 standard drinks of alcohol     Types: 7 Glasses of wine per week     Comment: socially, no hx excessive use when not pregnant    Drug use: No   Other Topics Concern    Caffeine Concern Yes     Comment: 1 tea    Exercise No    Seat Belt Yes    Self-Exams No   Social History Narrative    .  1 son born 4/2019.  Not currently working since moved from [place] 3/2019.  Used to work as an  for a [employer].        REVIEW OF SYSTEMS:   GENERAL: feels well otherwise  SKIN: denies any unusual skin lesions  EYES:no vision problems  LUNGS: denies shortness of breath  CARDIOVASCULAR: denies chest pain  GI: denies abdominal pain,  No constipation or diarrhea  : denies dysuria, vaginal discharge or itching  NEURO: denies headaches  PSYCHE: denies depression or anxiety    EXAM:   /70   Pulse 94   Temp 97.4 °F (36.3 °C)   Resp 16   Ht 5' 3\" (1.6 m)   Wt 123 lb 3.2 oz (55.9 kg)   LMP 06/25/2024 (Approximate)   BMI 21.82 kg/m²   Body mass index is 21.82 kg/m².   GENERAL: well developed, well nourished,in no apparent distress  SKIN: no rashes,no suspicious lesions  HEENT: atraumatic, normocephalic,throat are clear; dentition good  EYES:PERRLA, EOMI,conjunctiva are clear  NECK: supple,no adenopathy  BREAST:/  LUNGS: clear to auscultation  CARDIO: RRR without murmur  GI: good BS's,no masses, HSM or tenderness  :/  EXTREMITIES: no edema    ASSESSMENT AND PLAN:   Vane Ruffin is a 39 year old female who presents for a complete physical exam.  Encounter Diagnoses   Name  Primary?    Routine general medical examination at a health care facility Yes    Skin cancer screening      Pap with GYN  Mammogram:  Age 42.   Labs:  ordered  Colonoscopy:  Age 47  Vaccines recommended today:  /  Recommended low fat diet and regular exercise.    F/u annually or before as needed.   No orders of the defined types were placed in this encounter.      Meds & Refills for this Visit:  Requested Prescriptions     Signed Prescriptions Disp Refills    sertraline 50 MG Oral Tab 90 tablet 3     Sig: Take 1 tablet (50 mg total) by mouth daily.       Imaging & Consults:  DERM - INTERNAL

## 2024-09-09 ENCOUNTER — LAB ENCOUNTER (OUTPATIENT)
Dept: LAB | Age: 39
End: 2024-09-09
Attending: FAMILY MEDICINE
Payer: COMMERCIAL

## 2024-09-09 DIAGNOSIS — Z00.00 ROUTINE GENERAL MEDICAL EXAMINATION AT A HEALTH CARE FACILITY: ICD-10-CM

## 2024-09-09 LAB
ALBUMIN SERPL-MCNC: 4.3 G/DL (ref 3.2–4.8)
ALBUMIN/GLOB SERPL: 1.5 {RATIO} (ref 1–2)
ALP LIVER SERPL-CCNC: 60 U/L
ALT SERPL-CCNC: 9 U/L
ANION GAP SERPL CALC-SCNC: 7 MMOL/L (ref 0–18)
AST SERPL-CCNC: 13 U/L (ref ?–34)
BILIRUB SERPL-MCNC: 0.3 MG/DL (ref 0.3–1.2)
BUN BLD-MCNC: 14 MG/DL (ref 9–23)
CALCIUM BLD-MCNC: 9.5 MG/DL (ref 8.7–10.4)
CHLORIDE SERPL-SCNC: 105 MMOL/L (ref 98–112)
CHOLEST SERPL-MCNC: 197 MG/DL (ref ?–200)
CO2 SERPL-SCNC: 28 MMOL/L (ref 21–32)
CREAT BLD-MCNC: 0.81 MG/DL
EGFRCR SERPLBLD CKD-EPI 2021: 96 ML/MIN/1.73M2 (ref 60–?)
ERYTHROCYTE [DISTWIDTH] IN BLOOD BY AUTOMATED COUNT: 13.6 %
FASTING PATIENT LIPID ANSWER: YES
FASTING STATUS PATIENT QL REPORTED: YES
GLOBULIN PLAS-MCNC: 2.9 G/DL (ref 2–3.5)
GLUCOSE BLD-MCNC: 80 MG/DL (ref 70–99)
HCT VFR BLD AUTO: 36.6 %
HDLC SERPL-MCNC: 57 MG/DL (ref 40–59)
HGB BLD-MCNC: 12 G/DL
LDLC SERPL CALC-MCNC: 119 MG/DL (ref ?–100)
MCH RBC QN AUTO: 28.8 PG (ref 26–34)
MCHC RBC AUTO-ENTMCNC: 32.8 G/DL (ref 31–37)
MCV RBC AUTO: 87.8 FL
NONHDLC SERPL-MCNC: 140 MG/DL (ref ?–130)
OSMOLALITY SERPL CALC.SUM OF ELEC: 289 MOSM/KG (ref 275–295)
PLATELET # BLD AUTO: 208 10(3)UL (ref 150–450)
POTASSIUM SERPL-SCNC: 3.6 MMOL/L (ref 3.5–5.1)
PROT SERPL-MCNC: 7.2 G/DL (ref 5.7–8.2)
RBC # BLD AUTO: 4.17 X10(6)UL
SODIUM SERPL-SCNC: 140 MMOL/L (ref 136–145)
TRIGL SERPL-MCNC: 119 MG/DL (ref 30–149)
TSI SER-ACNC: 1.36 MIU/ML (ref 0.55–4.78)
VLDLC SERPL CALC-MCNC: 21 MG/DL (ref 0–30)
WBC # BLD AUTO: 6.5 X10(3) UL (ref 4–11)

## 2024-09-09 PROCEDURE — 80061 LIPID PANEL: CPT | Performed by: FAMILY MEDICINE

## 2024-09-09 PROCEDURE — 80050 GENERAL HEALTH PANEL: CPT | Performed by: FAMILY MEDICINE

## 2025-04-25 ENCOUNTER — OFFICE VISIT (OUTPATIENT)
Dept: FAMILY MEDICINE CLINIC | Facility: CLINIC | Age: 40
End: 2025-04-25
Payer: COMMERCIAL

## 2025-04-25 VITALS
BODY MASS INDEX: 22.39 KG/M2 | HEIGHT: 63 IN | TEMPERATURE: 98 F | SYSTOLIC BLOOD PRESSURE: 107 MMHG | HEART RATE: 86 BPM | DIASTOLIC BLOOD PRESSURE: 64 MMHG | RESPIRATION RATE: 17 BRPM | WEIGHT: 126.38 LBS | OXYGEN SATURATION: 99 %

## 2025-04-25 DIAGNOSIS — B07.9 VERRUCA VULGARIS: Primary | ICD-10-CM

## 2025-04-25 PROBLEM — O99.340: Status: ACTIVE | Noted: 2017-12-06

## 2025-04-25 PROCEDURE — 99213 OFFICE O/P EST LOW 20 MIN: CPT | Performed by: NURSE PRACTITIONER

## 2025-04-25 PROCEDURE — 3078F DIAST BP <80 MM HG: CPT | Performed by: NURSE PRACTITIONER

## 2025-04-25 PROCEDURE — 3008F BODY MASS INDEX DOCD: CPT | Performed by: NURSE PRACTITIONER

## 2025-04-25 PROCEDURE — 3074F SYST BP LT 130 MM HG: CPT | Performed by: NURSE PRACTITIONER

## 2025-04-25 PROCEDURE — 17110 DESTRUCTION B9 LES UP TO 14: CPT | Performed by: NURSE PRACTITIONER

## 2025-04-25 NOTE — PATIENT INSTRUCTIONS
Lesion will blister or scab over then next 3-5 days. Once this blister/scab has healed (another 2-6 days), apply over the counter wart remover medication (as per the  instructions). If lesion is not resolved in 3 weeks return to the office for repeat cryotherapy.

## 2025-04-25 NOTE — PROGRESS NOTES
Chief Complaint   Patient presents with    Warts     Patient c\o of possible wart in left thumb        HPI:  Presents with approx 3 week history of small, firm, mildly painful mass to left thumb. Reports seems to be getting slightly larger since she first noted it. Reports it is only tender if she presses directly on it. Denies injury to thumb, redness, swelling to thumb. Has not been treating with anything.     Past Medical History[1]    Problem List[2]    Current Medications[3]    Physical Exam  /64   Pulse 86   Temp 98.3 °F (36.8 °C) (Oral)   Resp 17   Ht 5' 3\" (1.6 m)   Wt 126 lb 6.4 oz (57.3 kg)   LMP 04/03/2025 (Approximate)   SpO2 99%   BMI 22.39 kg/m²   Constitutional: well developed, well nourished, in no apparent distress  HEENT: Normocephalic and atraumatic.   Cardiovascular: Normal rate.   Pulmonary/Chest: No respiratory distress. Effort normal.  Skin: Skin is warm and dry. No pallor. No rash noted. Left thumb palmar surface with 1mm firm, hyperkeratotic lesion with minimal TTP, no associated erythema or edema.     A/P:    Encounter Diagnosis   Name Primary?    Verruca vulgaris- cryotherapy employed in office today for 3 freeze/thaw cycles. Post-cryotherapy care discussed. RTO in 3 weeks for repeat therapy if lesion not resolved, sooner if worsening or other issues. Verbalized understanding of instructions and agreeable to this plan of care.    Yes       No orders of the defined types were placed in this encounter.      Meds & Refills for this Visit:  Requested Prescriptions      No prescriptions requested or ordered in this encounter       Imaging & Consults:  None    No follow-ups on file.  Patient Instructions                 Lesion will blister or scab over then next 3-5 days. Once this blister/scab has healed (another 2-6 days), apply over the counter wart remover medication (as per the  instructions). If lesion is not resolved in 3 weeks return to the office for repeat  cryotherapy.       All questions were answered and the patient understands the plan.          [1]   Past Medical History:   Anxiety    Disorder of liver    cholestasis of pregnancy    Hyperemesis gravidarum (HCC)    Iron deficiency anemia of pregnancy (HCC)     labor (HCC)   [2]   Patient Active Problem List  Diagnosis    Postpartum anemia (HCC)    Anxiety    Vitamin D deficiency    Postpartum hemorrhage (HCC)    Positive VALDEZ (antinuclear antibody)    Therapeutic drug monitoring    Peripheral polyneuropathy    Maternal mental disorder, antepartum (HCC)   [3]   Current Outpatient Medications   Medication Sig Dispense Refill    sertraline 50 MG Oral Tab Take 1 tablet (50 mg total) by mouth daily. 90 tablet 3    Multiple Vitamin (MULTI-VITAMIN DAILY) Oral Tab Take 1 tablet by mouth daily.
